# Patient Record
Sex: MALE | Race: WHITE | NOT HISPANIC OR LATINO | RURAL
[De-identification: names, ages, dates, MRNs, and addresses within clinical notes are randomized per-mention and may not be internally consistent; named-entity substitution may affect disease eponyms.]

---

## 2017-02-27 RX ORDER — MELOXICAM 15 MG/1
15 TABLET ORAL DAILY
Qty: 30 TABLET | Refills: 11 | Status: SHIPPED | OUTPATIENT
Start: 2017-02-27 | End: 2020-10-08

## 2017-03-01 ENCOUNTER — OFFICE VISIT (OUTPATIENT)
Dept: FAMILY MEDICINE CLINIC | Facility: CLINIC | Age: 47
End: 2017-03-01

## 2017-03-01 VITALS
DIASTOLIC BLOOD PRESSURE: 74 MMHG | TEMPERATURE: 97.9 F | RESPIRATION RATE: 16 BRPM | WEIGHT: 231 LBS | BODY MASS INDEX: 31.29 KG/M2 | HEART RATE: 78 BPM | HEIGHT: 72 IN | SYSTOLIC BLOOD PRESSURE: 120 MMHG

## 2017-03-01 DIAGNOSIS — E03.9 HYPOTHYROIDISM, UNSPECIFIED TYPE: ICD-10-CM

## 2017-03-01 DIAGNOSIS — E78.5 HYPERLIPIDEMIA, UNSPECIFIED HYPERLIPIDEMIA TYPE: ICD-10-CM

## 2017-03-01 DIAGNOSIS — M54.5 LOW BACK PAIN, UNSPECIFIED BACK PAIN LATERALITY, UNSPECIFIED CHRONICITY, WITH SCIATICA PRESENCE UNSPECIFIED: Primary | ICD-10-CM

## 2017-03-01 DIAGNOSIS — IMO0002 UNCONTROLLED TYPE 2 DIABETES MELLITUS WITH COMPLICATION, WITHOUT LONG-TERM CURRENT USE OF INSULIN: ICD-10-CM

## 2017-03-01 PROBLEM — M54.50 LOW BACK PAIN: Status: ACTIVE | Noted: 2017-03-01

## 2017-03-01 PROCEDURE — 96372 THER/PROPH/DIAG INJ SC/IM: CPT | Performed by: FAMILY MEDICINE

## 2017-03-01 PROCEDURE — 99213 OFFICE O/P EST LOW 20 MIN: CPT | Performed by: FAMILY MEDICINE

## 2017-03-01 RX ORDER — CYCLOBENZAPRINE HCL 5 MG
5 TABLET ORAL 2 TIMES DAILY PRN
Qty: 60 TABLET | Refills: 3 | Status: SHIPPED | OUTPATIENT
Start: 2017-03-01 | End: 2019-01-25

## 2017-03-01 NOTE — PROGRESS NOTES
Subjective   Manohar Marie is a 46 y.o. male.     Problem List  1. DM type 2   2. Hyperlipidemia  3. Hypothyroidism  4. Erectile Dysfunction  5. Left arm pain/shoulder pain    Patient is 45 yo WM with the above medical issues. States a week ago he hurt his back at home while lifting heavy object.  Mainly lower back pain around lumbar area that radiates to his right lower leg. Grades it 7/10 on severity. Mainly a dull aching pain.  Denies any alarming symptoms. Has good bowel and bladder function.  Pt states he cannot afford PT/OT and insurance will not pay for therapy.  Pt was recently given mobic 15 mg PO q daily for pain and has only taken it for a couple of days.  Has not had recent back x-rays done    Regarding DM type 2. Last hga1c was 10.0 in October 2016.  Currently taking metformin 1000 mg PO BID along with Farxiga 5 mg PO q daily.  States sugars in running usually running in 130-140.  Pt UTD with diabetic eye examination.  Is also due for new labwork.    Regarding hyperlipidemia - taking aspirin and lipitor.      Also has history of hypothyroidism and is taking synthroid 25 mcg PO q daily.   Due for recheck on that as well.      Diabetes   He presents for his follow-up diabetic visit. He has type 2 diabetes mellitus. His disease course has been fluctuating. Pertinent negatives for hypoglycemia include no confusion, dizziness, headaches, hunger, mood changes, nervousness/anxiousness, pallor, seizures, sleepiness, speech difficulty, sweats or tremors. Pertinent negatives for diabetes include no blurred vision, no chest pain, no fatigue, no foot paresthesias, no foot ulcerations, no polydipsia, no polyphagia, no polyuria, no visual change, no weakness and no weight loss. Pertinent negatives for hypoglycemia complications include no blackouts, no hospitalization, no nocturnal hypoglycemia, no required assistance and no required glucagon injection. Symptoms are stable. Pertinent negatives for diabetic  complications include no autonomic neuropathy, CVA, heart disease, impotence, nephropathy, peripheral neuropathy, PVD or retinopathy. Risk factors for coronary artery disease include diabetes mellitus, male sex and sedentary lifestyle. Current diabetic treatment includes oral agent (dual therapy). He is compliant with treatment most of the time. His weight is stable. He is following a generally unhealthy diet. When asked about meal planning, he reported none. He has not had a previous visit with a dietitian. He participates in exercise intermittently. His breakfast blood glucose is taken between 8-9 am. His breakfast blood glucose range is generally 140-180 mg/dl. An ACE inhibitor/angiotensin II receptor blocker is not being taken. He does not see a podiatrist.Eye exam is current.   Back Pain   This is a new problem. The current episode started 1 to 4 weeks ago. The problem occurs daily. The problem is unchanged. The pain is present in the lumbar spine, sacro-iliac and thoracic spine. The quality of the pain is described as aching. The pain radiates to the right foot. The pain is at a severity of 7/10. The pain is moderate. The pain is the same all the time. The symptoms are aggravated by bending, lying down, position, sitting and twisting. Stiffness is present all day. Pertinent negatives include no abdominal pain, bladder incontinence, bowel incontinence, chest pain, dysuria, fever, headaches, leg pain, numbness, paresis, paresthesias, pelvic pain, perianal numbness, tingling, weakness or weight loss. He has tried NSAIDs for the symptoms. The treatment provided mild relief.        The following portions of the patient's history were reviewed and updated as appropriate: allergies, current medications, past family history, past medical history, past social history, past surgical history and problem list.    Review of Systems   Constitutional: Negative.  Negative for fatigue, fever and weight loss.   HENT: Negative.   "  Eyes: Negative.  Negative for blurred vision.   Respiratory: Negative.    Cardiovascular: Negative.  Negative for chest pain.   Gastrointestinal: Negative.  Negative for abdominal pain and bowel incontinence.   Endocrine: Negative.  Negative for polydipsia, polyphagia and polyuria.   Genitourinary: Negative for bladder incontinence, dysuria, impotence and pelvic pain.   Musculoskeletal: Positive for arthralgias and back pain.   Skin: Negative.  Negative for pallor.   Allergic/Immunologic: Negative.    Neurological: Negative.  Negative for dizziness, tingling, tremors, seizures, speech difficulty, weakness, numbness, headaches and paresthesias.   Hematological: Negative.    Psychiatric/Behavioral: Negative.  Negative for confusion. The patient is not nervous/anxious.        Objective    Visit Vitals   • /74   • Pulse 78   • Temp 97.9 °F (36.6 °C)   • Resp 16   • Ht 72\" (182.9 cm)   • Wt 231 lb (105 kg)   • BMI 31.33 kg/m2     \    Chemistry        Component Value Date/Time     (L) 10/21/2016 1109    K 4.7 10/21/2016 1109    CL 98 10/21/2016 1109    CO2 28 10/21/2016 1109    BUN 13 10/21/2016 1109    CREATININE 0.9 10/21/2016 1109        Component Value Date/Time    CALCIUM 9.2 10/21/2016 1109    ALKPHOS 111 10/21/2016 1109    AST 19 10/21/2016 1109    ALT 34 10/21/2016 1109    BILITOT 1.1 10/21/2016 1109        Lab Results   Component Value Date    WBC 5.9 10/21/2016    HGB 16.5 10/21/2016    HCT 45.3 10/21/2016    MCV 85.2 10/21/2016     10/21/2016     No results found for: CHOL  Lab Results   Component Value Date    TRIG 187 10/21/2016     Lab Results   Component Value Date    HDL 53 (L) 10/21/2016     Lab Results   Component Value Date    LDLCALC 105 10/21/2016     No results found for: LDL  No results found for: HDLLDLRATIO  No components found for: CHOLHDL  Lab Results   Component Value Date    HGBA1C 10.5 (H) 10/21/2016     Physical Exam   Constitutional: He is oriented to person, place, " and time. He appears well-developed and well-nourished. No distress.   HENT:   Head: Normocephalic and atraumatic.   Right Ear: External ear normal.   Eyes: Conjunctivae and EOM are normal. Pupils are equal, round, and reactive to light. Right eye exhibits no discharge. Left eye exhibits no discharge. No scleral icterus.   Neck: Normal range of motion. Neck supple. No JVD present. No tracheal deviation present. No thyromegaly present.   Cardiovascular: Normal rate, regular rhythm and normal heart sounds.    Pulmonary/Chest: Breath sounds normal. No stridor. No respiratory distress. He has no wheezes.   Abdominal: Soft. Bowel sounds are normal. He exhibits no distension and no mass. There is no tenderness. There is no rebound and no guarding. No hernia.   Musculoskeletal: He exhibits tenderness. He exhibits no edema or deformity.        Thoracic back: He exhibits decreased range of motion, tenderness, bony tenderness, pain and spasm.        Lumbar back: He exhibits decreased range of motion, tenderness, bony tenderness, pain and spasm.   Straight leg  raise test negative bialterally  No obvious bulging disc.     Lymphadenopathy:     He has no cervical adenopathy.   Neurological: He is alert and oriented to person, place, and time. He has normal reflexes. No cranial nerve deficit. Coordination normal.   Skin: Skin is warm and dry. No rash noted. He is not diaphoretic. No erythema. No pallor.   Psychiatric: He has a normal mood and affect. His behavior is normal. Judgment and thought content normal.   Nursing note and vitals reviewed.      Assessment/Plan   Problems Addressed this Visit        Cardiovascular and Mediastinum    Hyperlipidemia       Endocrine    Hypothyroidism    Uncontrolled type 2 diabetes mellitus with complication, without long-term current use of insulin    Relevant Medications    Dapagliflozin Propanediol (FARXIGA) 10 MG tablet    Other Relevant Orders    CBC Auto Differential    Comprehensive  Metabolic Panel    Hemoglobin A1c    Lipid Panel    Vitamin D 25 Hydroxy    Vitamin B12    Microalbumin / Creatinine Urine Ratio    TSH       Nervous and Auditory    Low back pain - Primary    Relevant Medications    ketorolac (TORADOL) injection 60 mg (Completed) (Start on 3/1/2017 11:30 AM)    Other Relevant Orders    XR Spine Thoracic 4+ View    XR Spine Lumbar 4+ View      - For lower back pain will get thoracic and lumbar x-rays.  Pt cannot afford PT/OT at this time.  Recommended and demonstrated back exercises in office to do at home and various stretches. Will give Toradol 60 mg IM shot today.  Pt to continue with mobic 15 mg PO q daily and also flexeril 5 mg PO BID PRN.  Consider MRI of thoracic or lumbar spine if not getting better  - regarding hypothyroidsm - recheck TSH - continue with synthroid  - For DM thpe 2 - recheck hga1c and microalbumin creatinine ratio and Vitamin B!2/  Pt to continue with Farxiga but will go up on dosage from 5 to 10 mg PO q daily.  Continue metformin 1000 mg PO BID.  Consider insulin or GLP 1 agonist if still not at goal  - recheck in 1 month  - discuss immunizations on next visit  -  Hyperlipidemia - recheck lipid panel. Continue with statin and aspirin

## 2017-03-01 NOTE — PATIENT INSTRUCTIONS
Take 2 pills of farxiga 5 mg = 10 mg  Then take 10 mg of farxiga   at pharmacy    Back Pain, Adult  Back pain is very common in adults. The cause of back pain is rarely dangerous and the pain often gets better over time. The cause of your back pain may not be known. Some common causes of back pain include:  · Strain of the muscles or ligaments supporting the spine.  · Wear and tear (degeneration) of the spinal disks.  · Arthritis.  · Direct injury to the back.  For many people, back pain may return. Since back pain is rarely dangerous, most people can learn to manage this condition on their own.  HOME CARE INSTRUCTIONS  Watch your back pain for any changes. The following actions may help to lessen any discomfort you are feeling:  · Remain active. It is stressful on your back to sit or  one place for long periods of time. Do not sit, drive, or  one place for more than 30 minutes at a time. Take short walks on even surfaces as soon as you are able. Try to increase the length of time you walk each day.  · Exercise regularly as directed by your health care provider. Exercise helps your back heal faster. It also helps avoid future injury by keeping your muscles strong and flexible.  · Do not stay in bed. Resting more than 1-2 days can delay your recovery.  · Pay attention to your body when you bend and lift. The most comfortable positions are those that put less stress on your recovering back. Always use proper lifting techniques, including:    Bending your knees.    Keeping the load close to your body.    Avoiding twisting.  · Find a comfortable position to sleep. Use a firm mattress and lie on your side with your knees slightly bent. If you lie on your back, put a pillow under your knees.  · Avoid feeling anxious or stressed. Stress increases muscle tension and can worsen back pain. It is important to recognize when you are anxious or stressed and learn ways to manage it, such as with  exercise.  · Take medicines only as directed by your health care provider. Over-the-counter medicines to reduce pain and inflammation are often the most helpful. Your health care provider may prescribe muscle relaxant drugs. These medicines help dull your pain so you can more quickly return to your normal activities and healthy exercise.  · Apply ice to the injured area:    Put ice in a plastic bag.    Place a towel between your skin and the bag.    Leave the ice on for 20 minutes, 2-3 times a day for the first 2-3 days. After that, ice and heat may be alternated to reduce pain and spasms.  · Maintain a healthy weight. Excess weight puts extra stress on your back and makes it difficult to maintain good posture.  SEEK MEDICAL CARE IF:  · You have pain that is not relieved with rest or medicine.  · You have increasing pain going down into the legs or buttocks.  · You have pain that does not improve in one week.  · You have night pain.  · You lose weight.  · You have a fever or chills.  SEEK IMMEDIATE MEDICAL CARE IF:   · You develop new bowel or bladder control problems.  · You have unusual weakness or numbness in your arms or legs.  · You develop nausea or vomiting.  · You develop abdominal pain.  · You feel faint.     This information is not intended to replace advice given to you by your health care provider. Make sure you discuss any questions you have with your health care provider.     Document Released: 12/18/2006 Document Revised: 01/08/2016 Document Reviewed: 04/21/2015  [employer] Interactive Patient Education ©2016 [employer]    Back Exercises  The following exercises strengthen the muscles that help to support the back. They also help to keep the lower back flexible. Doing these exercises can help to prevent back pain or lessen existing pain.  If you have back pain or discomfort, try doing these exercises 2-3 times each day or as told by your health care provider. When the pain goes away, do them once  each day, but increase the number of times that you repeat the steps for each exercise (do more repetitions). If you do not have back pain or discomfort, do these exercises once each day or as told by your health care provider.  EXERCISES  Single Knee to Chest  Repeat these steps 3-5 times for each le. Lie on your back on a firm bed or the floor with your legs extended.  2. Bring one knee to your chest. Your other leg should stay extended and in contact with the floor.  3. Hold your knee in place by grabbing your knee or thigh.  4. Pull on your knee until you feel a gentle stretch in your lower back.  5. Hold the stretch for 10-30 seconds.  6. Slowly release and straighten your leg.  Pelvic Tilt  Repeat these steps 5-10 times:  1. Lie on your back on a firm bed or the floor with your legs extended.  2. Bend your knees so they are pointing toward the ceiling and your feet are flat on the floor.  3. Tighten your lower abdominal muscles to press your lower back against the floor. This motion will tilt your pelvis so your tailbone points up toward the ceiling instead of pointing to your feet or the floor.  4. With gentle tension and even breathing, hold this position for 5-10 seconds.  Cat-Cow  Repeat these steps until your lower back becomes more flexible:  1. Get into a hands-and-knees position on a firm surface. Keep your hands under your shoulders, and keep your knees under your hips. You may place padding under your knees for comfort.  2. Let your head hang down, and point your tailbone toward the floor so your lower back becomes rounded like the back of a cat.  3. Hold this position for 5 seconds.  4. Slowly lift your head and point your tailbone up toward the ceiling so your back forms a sagging arch like the back of a cow.  5. Hold this position for 5 seconds.  Press-Ups  Repeat these steps 5-10 times:  1. Lie on your abdomen (face-down) on the floor.  2. Place your palms near your head, about  shoulder-width apart.  3. While you keep your back as relaxed as possible and keep your hips on the floor, slowly straighten your arms to raise the top half of your body and lift your shoulders. Do not use your back muscles to raise your upper torso. You may adjust the placement of your hands to make yourself more comfortable.  4. Hold this position for 5 seconds while you keep your back relaxed.  5. Slowly return to lying flat on the floor.  Bridges  Repeat these steps 10 times:  1. Lie on your back on a firm surface.  2. Bend your knees so they are pointing toward the ceiling and your feet are flat on the floor.  3. Tighten your buttocks muscles and lift your buttocks off of the floor until your waist is at almost the same height as your knees. You should feel the muscles working in your buttocks and the back of your thighs. If you do not feel these muscles, slide your feet 1-2 inches farther away from your buttocks.  4. Hold this position for 3-5 seconds.  5. Slowly lower your hips to the starting position, and allow your buttocks muscles to relax completely.  If this exercise is too easy, try doing it with your arms crossed over your chest.  Abdominal Crunches  Repeat these steps 5-10 times:  1. Lie on your back on a firm bed or the floor with your legs extended.  2. Bend your knees so they are pointing toward the ceiling and your feet are flat on the floor.  3. Cross your arms over your chest.  4. Tip your chin slightly toward your chest without bending your neck.  5. Tighten your abdominal muscles and slowly raise your trunk (torso) high enough to lift your shoulder blades a tiny bit off of the floor. Avoid raising your torso higher than that, because it can put too much stress on your low back and it does not help to strengthen your abdominal muscles.  6. Slowly return to your starting position.  Back Lifts  Repeat these steps 5-10 times:  1. Lie on your abdomen (face-down) with your arms at your sides, and  rest your forehead on the floor.  2. Tighten the muscles in your legs and your buttocks.  3. Slowly lift your chest off of the floor while you keep your hips pressed to the floor. Keep the back of your head in line with the curve in your back. Your eyes should be looking at the floor.  4. Hold this position for 3-5 seconds.  5. Slowly return to your starting position.  SEEK MEDICAL CARE IF:  · Your back pain or discomfort gets much worse when you do an exercise.  · Your back pain or discomfort does not lessen within 2 hours after you exercise.  If you have any of these problems, stop doing these exercises right away. Do not do them again unless your health care provider says that you can.  SEEK IMMEDIATE MEDICAL CARE IF:  · You develop sudden, severe back pain. If this happens, stop doing the exercises right away. Do not do them again unless your health care provider says that you can.     This information is not intended to replace advice given to you by your health care provider. Make sure you discuss any questions you have with your health care provider.     Document Released: 01/25/2006 Document Revised: 09/07/2016 Document Reviewed: 02/11/2016  [employer] Interactive Patient Education ©2016 [employer]

## 2019-01-25 ENCOUNTER — OFFICE VISIT (OUTPATIENT)
Dept: FAMILY MEDICINE CLINIC | Facility: CLINIC | Age: 49
End: 2019-01-25

## 2019-01-25 VITALS
TEMPERATURE: 97.3 F | OXYGEN SATURATION: 96 % | HEIGHT: 72 IN | SYSTOLIC BLOOD PRESSURE: 132 MMHG | DIASTOLIC BLOOD PRESSURE: 82 MMHG | BODY MASS INDEX: 30.48 KG/M2 | HEART RATE: 99 BPM | WEIGHT: 225 LBS

## 2019-01-25 DIAGNOSIS — IMO0001 UNCONTROLLED TYPE 2 DIABETES MELLITUS WITHOUT COMPLICATION, WITHOUT LONG-TERM CURRENT USE OF INSULIN: ICD-10-CM

## 2019-01-25 DIAGNOSIS — J20.9 ACUTE BRONCHITIS, UNSPECIFIED ORGANISM: Primary | ICD-10-CM

## 2019-01-25 DIAGNOSIS — R21 RASH: ICD-10-CM

## 2019-01-25 LAB — GLUCOSE BLDC GLUCOMTR-MCNC: 278 MG/DL (ref 70–130)

## 2019-01-25 PROCEDURE — 99214 OFFICE O/P EST MOD 30 MIN: CPT | Performed by: NURSE PRACTITIONER

## 2019-01-25 PROCEDURE — 82962 GLUCOSE BLOOD TEST: CPT | Performed by: NURSE PRACTITIONER

## 2019-01-25 RX ORDER — TRIAMCINOLONE ACETONIDE 1 MG/G
CREAM TOPICAL 2 TIMES DAILY PRN
Qty: 80 G | Refills: 0 | Status: SHIPPED | OUTPATIENT
Start: 2019-01-25 | End: 2020-10-26

## 2019-01-25 RX ORDER — AZITHROMYCIN 250 MG/1
TABLET, FILM COATED ORAL
Qty: 6 TABLET | Refills: 0 | Status: SHIPPED | OUTPATIENT
Start: 2019-01-25 | End: 2020-10-26

## 2019-01-25 NOTE — PATIENT INSTRUCTIONS
Rash  A rash is a change in the color of the skin. A rash can also change the way your skin feels. There are many different conditions and factors that can cause a rash.  Follow these instructions at home:  Pay attention to any changes in your symptoms. Follow these instructions to help with your condition:  Medicine  Take or apply over-the-counter and prescription medicines only as told by your doctor. These may include:  · Corticosteroid cream.  · Anti-itch lotions.  · Oral antihistamines.    Skin Care  · Put cool compresses on the affected areas.  · Try taking a bath with:  ? Epsom salts. Follow the instructions on the packaging. You can get these at your local pharmacy or grocery store.  ? Baking soda. Pour a small amount into the bath as told by your doctor.  ? Colloidal oatmeal. Follow the instructions on the packaging. You can get this at your local pharmacy or grocery store.  · Try putting baking soda paste onto your skin. Stir water into baking soda until it gets like a paste.  · Do not scratch or rub your skin.  · Avoid covering the rash. Make sure the rash is exposed to air as much as possible.  General instructions  · Avoid hot showers or baths, which can make itching worse. A cold shower may help.  · Avoid scented soaps, detergents, and perfumes. Use gentle soaps, detergents, perfumes, and other cosmetic products.  · Avoid anything that causes your rash. Keep a journal to help track what causes your rash. Write down:  ? What you eat.  ? What cosmetic products you use.  ? What you drink.  ? What you wear. This includes jewelry.  · Keep all follow-up visits as told by your doctor. This is important.  Contact a doctor if:  · You sweat at night.  · You lose weight.  · You pee (urinate) more than normal.  · You feel weak.  · You throw up (vomit).  · Your skin or the whites of your eyes look yellow (jaundice).  · Your skin:  ? Tingles.  ? Is numb.  · Your rash:  ? Does not go away after a few days.  ? Gets  worse.  · You are:  ? More thirsty than normal.  ? More tired than normal.  · You have:  ? New symptoms.  ? Pain in your belly (abdomen).  ? A fever.  ? Watery poop (diarrhea).  Get help right away if:  · Your rash covers all or most of your body. The rash may or may not be painful.  · You have blisters that:  ? Are on top of the rash.  ? Grow larger.  ? Grow together.  ? Are painful.  ? Are inside your nose or mouth.  · You have a rash that:  ? Looks like purple pinprick-sized spots all over your body.  ? Has a “bull's eye” or looks like a target.  ? Is red and painful, causes your skin to peel, and is not from being in the sun too long.  This information is not intended to replace advice given to you by your health care provider. Make sure you discuss any questions you have with your health care provider.  Document Released: 06/05/2009 Document Revised: 05/25/2017 Document Reviewed: 05/04/2016  [employer] Interactive Patient Education © 2018 [employer]  Acute Bronchitis, Adult  Acute bronchitis is when air tubes (bronchi) in the lungs suddenly get swollen. The condition can make it hard to breathe. It can also cause these symptoms:  · A cough.  · Coughing up clear, yellow, or green mucus.  · Wheezing.  · Chest congestion.  · Shortness of breath.  · A fever.  · Body aches.  · Chills.  · A sore throat.    Follow these instructions at home:  Medicines  · Take over-the-counter and prescription medicines only as told by your doctor.  · If you were prescribed an antibiotic medicine, take it as told by your doctor. Do not stop taking the antibiotic even if you start to feel better.  General instructions  · Rest.  · Drink enough fluids to keep your pee (urine) clear or pale yellow.  · Avoid smoking and secondhand smoke. If you smoke and you need help quitting, ask your doctor. Quitting will help your lungs heal faster.  · Use an inhaler, cool mist vaporizer, or humidifier as told by your doctor.  · Keep all follow-up  visits as told by your doctor. This is important.  How is this prevented?  To lower your risk of getting this condition again:  · Wash your hands often with soap and water. If you cannot use soap and water, use hand .  · Avoid contact with people who have cold symptoms.  · Try not to touch your hands to your mouth, nose, or eyes.  · Make sure to get the flu shot every year.    Contact a doctor if:  · Your symptoms do not get better in 2 weeks.  Get help right away if:  · You cough up blood.  · You have chest pain.  · You have very bad shortness of breath.  · You become dehydrated.  · You faint (pass out) or keep feeling like you are going to pass out.  · You keep throwing up (vomiting).  · You have a very bad headache.  · Your fever or chills gets worse.  This information is not intended to replace advice given to you by your health care provider. Make sure you discuss any questions you have with your health care provider.  Document Released: 06/05/2009 Document Revised: 07/26/2017 Document Reviewed: 06/07/2017  [employer] Interactive Patient Education © 2018 [employer]

## 2019-01-25 NOTE — PROGRESS NOTES
"Subjective   Manohar Marie is a 48 y.o. male.     [facility] Visit    PCP: Dr. Solis--has not seen since 2017    CC: \"chest cold x 3 weeks; itchy head for x 6 weeks\"          Cough   This is a new problem. The current episode started 1 to 4 weeks ago. The problem has been unchanged. The problem occurs every few minutes. The cough is non-productive. Associated symptoms include a rash. Pertinent negatives include no chest pain, chills, ear congestion, ear pain, fever, headaches, heartburn, hemoptysis, myalgias, nasal congestion, postnasal drip, rhinorrhea, sore throat, shortness of breath, sweats, weight loss or wheezing. Associated symptoms comments: Started with sinus congestion/drainage, but that improved after the first week  . The symptoms are aggravated by lying down. Treatments tried: mucinex yesterday. The treatment provided mild relief. There is no history of asthma or COPD.   Rash   This is a new problem. The current episode started more than 1 month ago. The problem has been waxing and waning since onset. The affected locations include the scalp. The rash is characterized by itchiness. Associated with: wears hats frequently. Associated symptoms include coughing. Pertinent negatives include no anorexia, congestion, diarrhea, eye pain, facial edema, fatigue, fever, joint pain, nail changes, rhinorrhea, shortness of breath, sore throat or vomiting. Treatments tried: tried Benadryl for a couple of days which seemed to help; also using a t-gel shampoo which helps some as well. The treatment provided mild relief. There is no history of asthma.        The following portions of the patient's history were reviewed and updated as appropriate: allergies, current medications, past medical history, past social history, past surgical history and problem list.    Review of Systems   Constitutional: Negative for appetite change, chills, fatigue, fever and unexpected weight loss.   HENT: Negative for congestion, " "ear pain, postnasal drip, rhinorrhea, sinus pressure, sneezing and sore throat.    Eyes: Negative for pain, discharge and itching.   Respiratory: Positive for cough and chest tightness. Negative for hemoptysis, shortness of breath and wheezing.    Cardiovascular: Negative for chest pain.   Gastrointestinal: Negative for anorexia, diarrhea, nausea and vomiting.   Musculoskeletal: Negative for joint pain, myalgias, neck pain and neck stiffness.   Skin: Positive for rash. Negative for nail changes.   Neurological: Negative for dizziness and headache.   Hematological: Negative for adenopathy.       Objective    /82 (BP Location: Left arm, Patient Position: Sitting, Cuff Size: Adult)   Pulse 99   Temp 97.3 °F (36.3 °C) (Tympanic)   Ht 182.9 cm (72\")   Wt 102 kg (225 lb)   SpO2 96%   BMI 30.52 kg/m²     Physical Exam   Constitutional: He is oriented to person, place, and time. He appears well-developed and well-nourished. No distress.   HENT:   Head: Normocephalic and atraumatic.   Right Ear: Tympanic membrane and ear canal normal.   Left Ear: Tympanic membrane and ear canal normal.   Nose: Nose normal. Right sinus exhibits no maxillary sinus tenderness and no frontal sinus tenderness. Left sinus exhibits no maxillary sinus tenderness and no frontal sinus tenderness.   Mouth/Throat: Uvula is midline, oropharynx is clear and moist and mucous membranes are normal.   Eyes: Conjunctivae are normal. Right eye exhibits no discharge. Left eye exhibits no discharge.   Neck: Neck supple.   Cardiovascular: Normal rate and regular rhythm.   Pulmonary/Chest: Effort normal. He has decreased breath sounds ( slightly). He has no wheezes. He has no rales.   Tight, congested cough noted   Lymphadenopathy:     He has no cervical adenopathy.   Neurological: He is alert and oriented to person, place, and time.   Skin:        Nursing note and vitals reviewed.    Recent Results (from the past 24 hour(s))   POC Glucose    Collection " Time: 01/25/19 12:32 PM   Result Value Ref Range    Glucose 278 (A) 70 - 130 mg/dL         Assessment/Plan   Manohar was seen today for cough, uri and hair/scalp problem.    Diagnoses and all orders for this visit:    Acute bronchitis, unspecified organism  -     azithromycin (ZITHROMAX Z-ROSMERY) 250 MG tablet; Take 2 tablets the first day, then 1 tablet daily for 4 days.    Uncontrolled type 2 diabetes mellitus without complication, without long-term current use of insulin (CMS/[facility])  -     POC Glucose    Rash  -     triamcinolone (KENALOG) 0.1 % cream; Apply  topically to the appropriate area as directed 2 (Two) Times a Day As Needed for Irritation or Rash. To bumps on scalp      Push fluids  Rest  Continue with Mucinex for the next 4-5 days  Rx for Zithromax provided  Declines inhaler  Will defer steroid due to elevated glucose levels  Rx for Steroid cream to use on rash to scalp as needed as I suspect some degree of dermatitis from wearing hats    Stressed the importance of regular f/u visits regarding diabetes taking medications as prescribed (reports he only takes meds occasionally).   He will schedule f/u for diabetes management with Dr. Solis prior to leaving office today.

## 2020-10-08 ENCOUNTER — TELEPHONE (OUTPATIENT)
Dept: FAMILY MEDICINE CLINIC | Facility: CLINIC | Age: 50
End: 2020-10-08

## 2020-10-08 ENCOUNTER — OFFICE VISIT (OUTPATIENT)
Dept: FAMILY MEDICINE CLINIC | Facility: CLINIC | Age: 50
End: 2020-10-08

## 2020-10-08 VITALS
TEMPERATURE: 97.5 F | WEIGHT: 222.2 LBS | OXYGEN SATURATION: 100 % | DIASTOLIC BLOOD PRESSURE: 96 MMHG | BODY MASS INDEX: 30.14 KG/M2 | SYSTOLIC BLOOD PRESSURE: 138 MMHG | HEART RATE: 74 BPM

## 2020-10-08 DIAGNOSIS — M54.50 ACUTE LOW BACK PAIN WITHOUT SCIATICA, UNSPECIFIED BACK PAIN LATERALITY: Primary | ICD-10-CM

## 2020-10-08 PROCEDURE — 99213 OFFICE O/P EST LOW 20 MIN: CPT | Performed by: STUDENT IN AN ORGANIZED HEALTH CARE EDUCATION/TRAINING PROGRAM

## 2020-10-08 RX ORDER — MELOXICAM 15 MG/1
15 TABLET ORAL DAILY
Qty: 60 TABLET | Refills: 0 | Status: SHIPPED | OUTPATIENT
Start: 2020-10-08

## 2020-10-08 NOTE — PROGRESS NOTES
Subjective:  Manohar Marie is a 50 y.o. male who presents for back pain    Started couple days ago at work; has gradually gotten worse; denies inciting event or trauma; pain does not radiate; no incontinence, numbness, tingling, weakness; has occurred in the past; was seen for similar issue back in 2017; had insurance issues so has not returned to care; problem went away with meloxicam; did not see physical therapy at that time; x-rays were ordered however they were not obtained; reports history of herniated disc.      Patient Active Problem List   Diagnosis   • Erectile dysfunction   • Hypothyroidism   • Hyperlipidemia   • Uncontrolled type 2 diabetes mellitus without complication, without long-term current use of insulin   • Left arm pain   • Chronic left shoulder pain   • Low back pain   • Uncontrolled type 2 diabetes mellitus with complication, without long-term current use of insulin (CMS/[facility])   • Acute bronchitis   • Rash           Current Outpatient Medications:   •  aspirin 81 MG tablet, Take 1 tablet by mouth Daily., Disp: 30 tablet, Rfl: 11  •  atorvastatin (LIPITOR) 20 MG tablet, Take 1 tablet by mouth Daily., Disp: 30 tablet, Rfl: 11  •  azithromycin (ZITHROMAX Z-ROSMERY) 250 MG tablet, Take 2 tablets the first day, then 1 tablet daily for 4 days., Disp: 6 tablet, Rfl: 0  •  Dapagliflozin Propanediol (FARXIGA) 10 MG tablet, Take 10 mg by mouth Daily., Disp: 30 tablet, Rfl: 11  •  levothyroxine (LEVOTHROID) 25 MCG tablet, Take 1 tablet by mouth Daily., Disp: 30 tablet, Rfl: 11  •  meloxicam (Mobic) 15 MG tablet, Take 1 tablet by mouth Daily., Disp: 60 tablet, Rfl: 0  •  metFORMIN (GLUCOPHAGE) 1000 MG tablet, Take 1 tablet by mouth 2 (Two) Times a Day With Meals., Disp: 60 tablet, Rfl: 11  •  sildenafil (VIAGRA) 25 MG tablet, Take 1 tablet by mouth Daily As Needed for erectile dysfunction., Disp: 10 tablet, Rfl: 6  •  triamcinolone (KENALOG) 0.1 % cream, Apply  topically to the appropriate area as  directed 2 (Two) Times a Day As Needed for Irritation or Rash. To bumps on scalp, Disp: 80 g, Rfl: 0      Review of Systems  Review of Systems   Constitutional: Negative for chills and fever.   HENT: Negative for congestion and sore throat.    Eyes: Negative for pain and visual disturbance.   Respiratory: Negative for cough and shortness of breath.    Cardiovascular: Negative for chest pain and palpitations.   Gastrointestinal: Negative for nausea and vomiting.   Endocrine: Negative for polydipsia and polyuria.   Genitourinary: Negative for dysuria and hematuria.   Musculoskeletal: Positive for back pain. Negative for neck stiffness.   Skin: Negative for rash and wound.   Neurological: Negative for dizziness and headaches.   Psychiatric/Behavioral: Negative for behavioral problems and confusion.       Patient Active Problem List   Diagnosis   • Erectile dysfunction   • Hypothyroidism   • Hyperlipidemia   • Uncontrolled type 2 diabetes mellitus without complication, without long-term current use of insulin   • Left arm pain   • Chronic left shoulder pain   • Low back pain   • Uncontrolled type 2 diabetes mellitus with complication, without long-term current use of insulin (CMS/[facility])   • Acute bronchitis   • Rash     Past Surgical History:   Procedure Laterality Date   • KIDNEY STONE SURGERY       Social History     Socioeconomic History   • Marital status: Single     Spouse name: Not on file   • Number of children: Not on file   • Years of education: Not on file   • Highest education level: Not on file   Tobacco Use   • Smoking status: Never Smoker   • Smokeless tobacco: Never Used   Substance and Sexual Activity   • Alcohol use: No   • Drug use: No   • Sexual activity: Defer     Family History   Problem Relation Age of Onset   • Cancer Mother    • Diabetes Father      No visits with results within 6 Month(s) from this visit.   Latest known visit with results is:   Office Visit on 01/25/2019   Component Date Value Ref  Range Status   • Glucose 01/25/2019 278* 70 - 130 mg/dL Final      No image results found.    [unfilled]    There is no immunization history on file for this patient.    The following portions of the patient's history were reviewed and updated as appropriate: allergies, current medications, past family history, past medical history, past social history, past surgical history and problem list.        Physical Exam  Physical Exam  Constitutional:       General: He is not in acute distress.  HENT:      Head: Normocephalic and atraumatic.      Right Ear: Tympanic membrane and ear canal normal.      Left Ear: Tympanic membrane and ear canal normal.      Mouth/Throat:      Mouth: Mucous membranes are moist.      Pharynx: Oropharynx is clear. No posterior oropharyngeal erythema.   Eyes:      Extraocular Movements: Extraocular movements intact.      Pupils: Pupils are equal, round, and reactive to light.   Cardiovascular:      Rate and Rhythm: Normal rate and regular rhythm.      Heart sounds: Normal heart sounds. No murmur.   Pulmonary:      Effort: Pulmonary effort is normal.      Breath sounds: Normal breath sounds.   Abdominal:      General: Bowel sounds are normal.      Palpations: Abdomen is soft.      Tenderness: There is no abdominal tenderness.   Musculoskeletal:        Arms:       Right lower leg: No edema.      Left lower leg: No edema.   Skin:     Coloration: Skin is not jaundiced.      Findings: No rash.   Neurological:      Mental Status: He is alert and oriented to person, place, and time. Mental status is at baseline.   Psychiatric:         Mood and Affect: Mood normal.         Behavior: Behavior normal.         Assessment/Plan    Diagnosis Plan   1. Acute low back pain without sciatica, unspecified back pain laterality  XR Spine Lumbar 4+ View (In Office)    meloxicam (Mobic) 15 MG tablet    Ambulatory Referral to Physical Therapy      Orders Placed This Encounter   Procedures   • XR Spine Lumbar 4+ View  (In Office)     Order Specific Question:   Reason for Exam:     Answer:   low back pain   • Ambulatory Referral to Physical Therapy     Referral Priority:   Routine     Referral Type:   Therapy     Referral Reason:   Specialty Services Required     Requested Specialty:   Physical Therapy     Number of Visits Requested:   1     Back pain, uncomplicated, does not radiate; prescribed meloxicam daily for 2 weeks, as needed thereafter; counseled on possible adverse effects of medication; will obtain x-ray of spine and refer to physical therapy; counseled patient on need to follow-up with Dr. Solis for chronic medical issues and management, patient agreeable, follow-up in 2 weeks         This document has been electronically signed by Salvador Singh MD on October 8, 2020 14:10 CDT

## 2020-10-14 NOTE — PROGRESS NOTES
Subjective:  Manohar Marie is a 50 y.o. male who presents for       Patient Active Problem List   Diagnosis   • Erectile dysfunction   • Hypothyroidism   • Hyperlipidemia   • Uncontrolled type 2 diabetes mellitus without complication, without long-term current use of insulin   • Left arm pain   • Chronic left shoulder pain   • Low back pain   • Uncontrolled type 2 diabetes mellitus with complication, without long-term current use of insulin (CMS/H[facility])   • Acute bronchitis   • Rash   • Chronic low back pain with bilateral sciatica   • Overweight           Current Outpatient Medications:   •  meloxicam (Mobic) 15 MG tablet, Take 1 tablet by mouth Daily., Disp: 60 tablet, Rfl: 0      Pt is 49 yo male with management of obesity, DM type 2, hypothyroidism, ED, HLP, sp kidney stone surgery, chronic back pain       3/1/17 Patient is 47 yo WM with the above medical issues. States a week ago he hurt his back at home while lifting heavy object.  Mainly lower back pain around lumbar area that radiates to his right lower leg. Grades it 7/10 on severity. Mainly a dull aching pain.  Denies any alarming symptoms. Has good bowel and bladder function.  Pt states he cannot afford PT/OT and insurance will not pay for therapy.  Pt was recently given mobic 15 mg PO q daily for pain and has only taken it for a couple of days.  Has not had recent back x-rays doneRegarding DM type 2. Last hga1c was 10.0 in October 2016.  Currently taking metformin 1000 mg PO BID along with Farxiga 5 mg PO q daily.  States sugars in running usually running in 130-140.  Pt UTD with diabetic eye examination.  Is also due for new labwork.Regarding hyperlipidemia - taking aspirin and lipitor.     Also has history of hypothyroidism and is taking synthroid 25 mcg PO q daily.   Due for recheck on that as well.     10/26/20 pt is here to reestablish. I have not seen pt since 2017. Pt has history of DM Type 2, hypothyroidism. Recently saw walk in clinic  With  Dr. Singh on 10/8/20 for back pain. He was referred to PT/OT and restarted on mobic. He was also ordered X-ray of lumbar spine that was orderd on 10/8/20 and has yet to be completed.  He stoped taking his medications for HLP, DM type 2   He stopped taking his medication over a year ago. He states he feels fatigued.   He works 5 days a week parttime.  He lifts boxes every day.  His back is feeling somewhat better .          Diabetes  He presents for his follow-up diabetic visit. His disease course has been fluctuating. Pertinent negatives for hypoglycemia include no confusion, dizziness, headaches, hunger, mood changes, nervousness/anxiousness, pallor, seizures or tremors. Associated symptoms include weakness. Pertinent negatives for diabetes include no chest pain, no fatigue, no visual change and no weight loss. Pertinent negatives for hypoglycemia complications include no blackouts and no hospitalization. Symptoms are stable. Pertinent negatives for diabetic complications include no autonomic neuropathy or heart disease. Risk factors for coronary artery disease include dyslipidemia, diabetes mellitus, obesity, sedentary lifestyle and stress. He is compliant with treatment most of the time. He is following a generally unhealthy diet. When asked about meal planning, he reported none. An ACE inhibitor/angiotensin II receptor blocker is not being taken. He does not see a podiatrist.Eye exam is not current.   Thyroid Problem  Presents for follow-up visit. Patient reports no anxiety, cold intolerance, constipation, depressed mood, diaphoresis, diarrhea, dry skin, fatigue, hair loss, heat intolerance, hoarse voice, leg swelling, menstrual problem, nail problem, palpitations, tremors, visual change, weight gain or weight loss.   Back Pain  This is a chronic problem. The current episode started more than 1 year ago. The problem occurs constantly. The problem is unchanged. The pain is present in the lumbar spine. The quality  of the pain is described as aching. The pain is at a severity of 3/10. The pain is mild. The pain is the same all the time. The symptoms are aggravated by bending, lying down, position and twisting. Associated symptoms include numbness and weakness. Pertinent negatives include no abdominal pain, bladder incontinence, bowel incontinence, chest pain, dysuria, fever, headaches, leg pain, paresis, paresthesias, pelvic pain, perianal numbness, tingling or weight loss. He has tried NSAIDs for the symptoms. The treatment provided mild relief.                  Review of Systems  Review of Systems   Constitutional: Positive for activity change. Negative for appetite change, chills, diaphoresis, fatigue, fever, weight gain and weight loss.   HENT: Negative for congestion, hoarse voice, postnasal drip, rhinorrhea, sinus pressure, sinus pain, sneezing, sore throat, trouble swallowing and voice change.    Respiratory: Negative for cough, choking, chest tightness, shortness of breath, wheezing and stridor.    Cardiovascular: Negative for chest pain and palpitations.   Gastrointestinal: Negative for abdominal pain, bowel incontinence, constipation, diarrhea, nausea and vomiting.   Endocrine: Negative for cold intolerance and heat intolerance.   Genitourinary: Negative for bladder incontinence, dysuria, menstrual problem and pelvic pain.   Musculoskeletal: Positive for arthralgias and back pain.   Skin: Negative for pallor.   Neurological: Positive for weakness and numbness. Negative for dizziness, tingling, tremors, seizures, headaches and paresthesias.   Psychiatric/Behavioral: Negative for confusion. The patient is not nervous/anxious.        Patient Active Problem List   Diagnosis   • Erectile dysfunction   • Hypothyroidism   • Hyperlipidemia   • Uncontrolled type 2 diabetes mellitus without complication, without long-term current use of insulin   • Left arm pain   • Chronic left shoulder pain   • Low back pain   • Uncontrolled  "type 2 diabetes mellitus with complication, without long-term current use of insulin (CMS/[facility])   • Acute bronchitis   • Rash   • Chronic low back pain with bilateral sciatica   • Overweight     Past Surgical History:   Procedure Laterality Date   • KIDNEY STONE SURGERY       Social History     Socioeconomic History   • Marital status: Single     Spouse name: Not on file   • Number of children: Not on file   • Years of education: Not on file   • Highest education level: Not on file   Tobacco Use   • Smoking status: Never Smoker   • Smokeless tobacco: Never Used   Substance and Sexual Activity   • Alcohol use: No   • Drug use: No   • Sexual activity: Defer     Family History   Problem Relation Age of Onset   • Cancer Mother    • Diabetes Father      No visits with results within 6 Month(s) from this visit.   Latest known visit with results is:   Office Visit on 01/25/2019   Component Date Value Ref Range Status   • Glucose 01/25/2019 278* 70 - 130 mg/dL Final      No image results found.    @[facility]@    There is no immunization history on file for this patient.    The following portions of the patient's history were reviewed and updated as appropriate: allergies, current medications, past family history, past medical history, past social history, past surgical history and problem list.        Physical Exam  Temp 97.3 °F (36.3 °C)   Ht 182.9 cm (72\")   Wt 99.8 kg (220 lb)   BMI 29.84 kg/m²     Physical Exam  Vitals signs and nursing note reviewed.   Constitutional:       Appearance: He is well-developed. He is not diaphoretic.   HENT:      Head: Normocephalic and atraumatic.      Right Ear: External ear normal.   Eyes:      Conjunctiva/sclera: Conjunctivae normal.      Pupils: Pupils are equal, round, and reactive to light.   Neck:      Musculoskeletal: Normal range of motion and neck supple.   Cardiovascular:      Rate and Rhythm: Normal rate and regular rhythm.      Heart sounds: Normal heart sounds. No " murmur.   Pulmonary:      Effort: Pulmonary effort is normal. No respiratory distress.      Breath sounds: Normal breath sounds.   Abdominal:      General: Bowel sounds are normal. There is no distension.      Palpations: Abdomen is soft.      Tenderness: There is no abdominal tenderness.   Musculoskeletal:         General: Tenderness present. No deformity.      Lumbar back: He exhibits decreased range of motion, tenderness, bony tenderness, pain and spasm.   Skin:     General: Skin is warm.      Coloration: Skin is not pale.      Findings: No erythema or rash.   Neurological:      Mental Status: He is alert and oriented to person, place, and time.      Cranial Nerves: No cranial nerve deficit.   Psychiatric:         Behavior: Behavior normal.         Assessment/Plan    Diagnosis Plan   1. Uncontrolled type 2 diabetes mellitus with complication, without long-term current use of insulin (CMS/[facility])  CBC Auto Differential    Comprehensive Metabolic Panel    Hemoglobin A1c    Lipid Panel    TSH    T4, Free    T3, Free    Vitamin D 25 Hydroxy    Vitamin B12    Microalbumin / Creatinine Urine Ratio - Urine, Clean Catch    Hepatitis C Antibody   2. Hypothyroidism, unspecified type  CBC Auto Differential    Comprehensive Metabolic Panel    Hemoglobin A1c    Lipid Panel    TSH    T4, Free    T3, Free    Vitamin D 25 Hydroxy    Vitamin B12    Microalbumin / Creatinine Urine Ratio - Urine, Clean Catch    Hepatitis C Antibody   3. Hyperlipidemia, unspecified hyperlipidemia type  CBC Auto Differential    Comprehensive Metabolic Panel    Hemoglobin A1c    Lipid Panel    TSH    T4, Free    T3, Free    Vitamin D 25 Hydroxy    Vitamin B12    Microalbumin / Creatinine Urine Ratio - Urine, Clean Catch    Hepatitis C Antibody   4. Chronic low back pain with bilateral sciatica, unspecified back pain laterality  CBC Auto Differential    Comprehensive Metabolic Panel    Hemoglobin A1c    Lipid Panel    TSH    T4, Free    T3, Free     Vitamin D 25 Hydroxy    Vitamin B12    Microalbumin / Creatinine Urine Ratio - Urine, Clean Catch    Hepatitis C Antibody   5. Need for hepatitis C screening test  CBC Auto Differential    Comprehensive Metabolic Panel    Hemoglobin A1c    Lipid Panel    TSH    T4, Free    T3, Free    Vitamin D 25 Hydroxy    Vitamin B12    Microalbumin / Creatinine Urine Ratio - Urine, Clean Catch    Hepatitis C Antibody   6. Overweight          -recommend labwork  -recommend colonoscopy  -annual physical exam today   -recommend pneumonia vaccination   -recommend influenza vaccination  -hep C screening - hep c antibody  -overweight - counseled weight loss >5 minutes BMI at 29.84.   -hypothyroidism - of synthroid   -HLP -of lipitor   -DM type 2 - of farxiga   -back pain - on mobic/ referred to PT/OT.  X-ray of lumbar spine ordere.    -advised pt to be safe and call with questions and concerns  -advsied pt to go to ER or call 911 if symptoms worrisome or severe  -advised pt to followup with specialist and referrals  -advised pt to be safe during COVID-19 pandemic  -total time with pt >25 minutes   -recheck in 2 weeks         This document has been electronically signed by Star Solis MD on October 26, 2020 13:12 CDT

## 2020-10-26 ENCOUNTER — OFFICE VISIT (OUTPATIENT)
Dept: FAMILY MEDICINE CLINIC | Facility: CLINIC | Age: 50
End: 2020-10-26

## 2020-10-26 VITALS
TEMPERATURE: 97.3 F | SYSTOLIC BLOOD PRESSURE: 112 MMHG | DIASTOLIC BLOOD PRESSURE: 68 MMHG | BODY MASS INDEX: 29.8 KG/M2 | WEIGHT: 220 LBS | HEART RATE: 88 BPM | HEIGHT: 72 IN | OXYGEN SATURATION: 98 %

## 2020-10-26 DIAGNOSIS — G89.29 CHRONIC LOW BACK PAIN WITH BILATERAL SCIATICA, UNSPECIFIED BACK PAIN LATERALITY: Primary | ICD-10-CM

## 2020-10-26 DIAGNOSIS — M54.41 CHRONIC LOW BACK PAIN WITH BILATERAL SCIATICA, UNSPECIFIED BACK PAIN LATERALITY: Primary | ICD-10-CM

## 2020-10-26 DIAGNOSIS — E78.5 HYPERLIPIDEMIA, UNSPECIFIED HYPERLIPIDEMIA TYPE: ICD-10-CM

## 2020-10-26 DIAGNOSIS — IMO0002 UNCONTROLLED TYPE 2 DIABETES MELLITUS WITH COMPLICATION, WITHOUT LONG-TERM CURRENT USE OF INSULIN: ICD-10-CM

## 2020-10-26 DIAGNOSIS — M54.42 CHRONIC LOW BACK PAIN WITH BILATERAL SCIATICA, UNSPECIFIED BACK PAIN LATERALITY: Primary | ICD-10-CM

## 2020-10-26 DIAGNOSIS — Z11.59 NEED FOR HEPATITIS C SCREENING TEST: ICD-10-CM

## 2020-10-26 DIAGNOSIS — E66.3 OVERWEIGHT: ICD-10-CM

## 2020-10-26 DIAGNOSIS — E03.9 HYPOTHYROIDISM, UNSPECIFIED TYPE: ICD-10-CM

## 2020-10-26 PROCEDURE — 99203 OFFICE O/P NEW LOW 30 MIN: CPT | Performed by: FAMILY MEDICINE

## 2020-10-28 ENCOUNTER — TELEPHONE (OUTPATIENT)
Dept: FAMILY MEDICINE CLINIC | Facility: CLINIC | Age: 50
End: 2020-10-28

## 2020-10-28 ENCOUNTER — LAB (OUTPATIENT)
Dept: LAB | Facility: HOSPITAL | Age: 50
End: 2020-10-28

## 2020-10-28 DIAGNOSIS — E78.5 HYPERLIPIDEMIA, UNSPECIFIED HYPERLIPIDEMIA TYPE: ICD-10-CM

## 2020-10-28 DIAGNOSIS — IMO0002 UNCONTROLLED TYPE 2 DIABETES MELLITUS WITH COMPLICATION, WITHOUT LONG-TERM CURRENT USE OF INSULIN: ICD-10-CM

## 2020-10-28 DIAGNOSIS — Z11.59 NEED FOR HEPATITIS C SCREENING TEST: ICD-10-CM

## 2020-10-28 DIAGNOSIS — G89.29 CHRONIC LOW BACK PAIN WITH BILATERAL SCIATICA, UNSPECIFIED BACK PAIN LATERALITY: ICD-10-CM

## 2020-10-28 DIAGNOSIS — E03.9 HYPOTHYROIDISM, UNSPECIFIED TYPE: ICD-10-CM

## 2020-10-28 DIAGNOSIS — M54.41 CHRONIC LOW BACK PAIN WITH BILATERAL SCIATICA, UNSPECIFIED BACK PAIN LATERALITY: ICD-10-CM

## 2020-10-28 DIAGNOSIS — M54.42 CHRONIC LOW BACK PAIN WITH BILATERAL SCIATICA, UNSPECIFIED BACK PAIN LATERALITY: ICD-10-CM

## 2020-10-28 LAB
25(OH)D3 SERPL-MCNC: 32 NG/ML (ref 30–100)
ALBUMIN SERPL-MCNC: 4.2 G/DL (ref 3.5–5.2)
ALBUMIN UR-MCNC: <1.2 MG/DL
ALBUMIN/GLOB SERPL: 1.8 G/DL
ALP SERPL-CCNC: 92 U/L (ref 39–117)
ALT SERPL W P-5'-P-CCNC: 23 U/L (ref 1–41)
ANION GAP SERPL CALCULATED.3IONS-SCNC: 10.6 MMOL/L (ref 5–15)
AST SERPL-CCNC: 16 U/L (ref 1–40)
BASOPHILS # BLD AUTO: 0.06 10*3/MM3 (ref 0–0.2)
BASOPHILS NFR BLD AUTO: 1.4 % (ref 0–1.5)
BILIRUB SERPL-MCNC: 0.8 MG/DL (ref 0–1.2)
BUN SERPL-MCNC: 14 MG/DL (ref 6–20)
BUN/CREAT SERPL: 12.5 (ref 7–25)
CALCIUM SPEC-SCNC: 8.9 MG/DL (ref 8.6–10.5)
CHLORIDE SERPL-SCNC: 103 MMOL/L (ref 98–107)
CHOLEST SERPL-MCNC: 176 MG/DL (ref 0–200)
CO2 SERPL-SCNC: 24.4 MMOL/L (ref 22–29)
CREAT SERPL-MCNC: 1.12 MG/DL (ref 0.76–1.27)
CREAT UR-MCNC: 95.3 MG/DL
DEPRECATED RDW RBC AUTO: 38.5 FL (ref 37–54)
EOSINOPHIL # BLD AUTO: 0.1 10*3/MM3 (ref 0–0.4)
EOSINOPHIL NFR BLD AUTO: 2.3 % (ref 0.3–6.2)
ERYTHROCYTE [DISTWIDTH] IN BLOOD BY AUTOMATED COUNT: 12.2 % (ref 12.3–15.4)
GFR SERPL CREATININE-BSD FRML MDRD: 69 ML/MIN/1.73
GLOBULIN UR ELPH-MCNC: 2.3 GM/DL
GLUCOSE SERPL-MCNC: 215 MG/DL (ref 65–99)
HBA1C MFR BLD: 10 % (ref 4.8–5.6)
HCT VFR BLD AUTO: 43.9 % (ref 37.5–51)
HCV AB SER DONR QL: NORMAL
HDLC SERPL-MCNC: 47 MG/DL (ref 40–60)
HGB BLD-MCNC: 15.5 G/DL (ref 13–17.7)
IMM GRANULOCYTES # BLD AUTO: 0.04 10*3/MM3 (ref 0–0.05)
IMM GRANULOCYTES NFR BLD AUTO: 0.9 % (ref 0–0.5)
LDLC SERPL CALC-MCNC: 109 MG/DL (ref 0–100)
LDLC/HDLC SERPL: 2.26 {RATIO}
LYMPHOCYTES # BLD AUTO: 1.7 10*3/MM3 (ref 0.7–3.1)
LYMPHOCYTES NFR BLD AUTO: 38.5 % (ref 19.6–45.3)
MCH RBC QN AUTO: 30.6 PG (ref 26.6–33)
MCHC RBC AUTO-ENTMCNC: 35.3 G/DL (ref 31.5–35.7)
MCV RBC AUTO: 86.6 FL (ref 79–97)
MICROALBUMIN/CREAT UR: NORMAL MG/G{CREAT}
MONOCYTES # BLD AUTO: 0.33 10*3/MM3 (ref 0.1–0.9)
MONOCYTES NFR BLD AUTO: 7.5 % (ref 5–12)
NEUTROPHILS NFR BLD AUTO: 2.18 10*3/MM3 (ref 1.7–7)
NEUTROPHILS NFR BLD AUTO: 49.4 % (ref 42.7–76)
NRBC BLD AUTO-RTO: 0 /100 WBC (ref 0–0.2)
PLATELET # BLD AUTO: 202 10*3/MM3 (ref 140–450)
PMV BLD AUTO: 12.3 FL (ref 6–12)
POTASSIUM SERPL-SCNC: 4.8 MMOL/L (ref 3.5–5.2)
PROT SERPL-MCNC: 6.5 G/DL (ref 6–8.5)
RBC # BLD AUTO: 5.07 10*6/MM3 (ref 4.14–5.8)
SODIUM SERPL-SCNC: 138 MMOL/L (ref 136–145)
T3FREE SERPL-MCNC: 2.77 PG/ML (ref 2–4.4)
T4 FREE SERPL-MCNC: 1.02 NG/DL (ref 0.93–1.7)
TRIGL SERPL-MCNC: 113 MG/DL (ref 0–150)
TSH SERPL DL<=0.05 MIU/L-ACNC: 5.99 UIU/ML (ref 0.27–4.2)
VIT B12 BLD-MCNC: 524 PG/ML (ref 211–946)
VLDLC SERPL-MCNC: 20 MG/DL (ref 5–40)
WBC # BLD AUTO: 4.41 10*3/MM3 (ref 3.4–10.8)

## 2020-10-28 PROCEDURE — 86803 HEPATITIS C AB TEST: CPT

## 2020-10-28 PROCEDURE — 84439 ASSAY OF FREE THYROXINE: CPT

## 2020-10-28 PROCEDURE — 36415 COLL VENOUS BLD VENIPUNCTURE: CPT

## 2020-10-28 PROCEDURE — 85025 COMPLETE CBC W/AUTO DIFF WBC: CPT

## 2020-10-28 PROCEDURE — 83036 HEMOGLOBIN GLYCOSYLATED A1C: CPT

## 2020-10-28 PROCEDURE — 82306 VITAMIN D 25 HYDROXY: CPT

## 2020-10-28 PROCEDURE — 80053 COMPREHEN METABOLIC PANEL: CPT

## 2020-10-28 PROCEDURE — 82043 UR ALBUMIN QUANTITATIVE: CPT

## 2020-10-28 PROCEDURE — 84443 ASSAY THYROID STIM HORMONE: CPT

## 2020-10-28 PROCEDURE — 80061 LIPID PANEL: CPT

## 2020-10-28 PROCEDURE — 82570 ASSAY OF URINE CREATININE: CPT

## 2020-10-28 PROCEDURE — 82607 VITAMIN B-12: CPT

## 2020-10-28 PROCEDURE — 84481 FREE ASSAY (FT-3): CPT

## 2020-11-02 ENCOUNTER — TELEPHONE (OUTPATIENT)
Dept: FAMILY MEDICINE CLINIC | Facility: CLINIC | Age: 50
End: 2020-11-02

## 2020-11-02 RX ORDER — ASPIRIN 81 MG/1
81 TABLET ORAL DAILY
Qty: 30 TABLET | Refills: 3 | Status: SHIPPED | OUTPATIENT
Start: 2020-11-02

## 2020-11-02 RX ORDER — ERGOCALCIFEROL 1.25 MG/1
50000 CAPSULE ORAL
Qty: 4 CAPSULE | Refills: 3 | Status: SHIPPED | OUTPATIENT
Start: 2020-11-02

## 2020-11-02 RX ORDER — ATORVASTATIN CALCIUM 20 MG/1
20 TABLET, FILM COATED ORAL NIGHTLY
Qty: 30 TABLET | Refills: 3 | Status: SHIPPED | OUTPATIENT
Start: 2020-11-02

## 2020-11-02 RX ORDER — LEVOTHYROXINE SODIUM 0.03 MG/1
25 TABLET ORAL DAILY
Qty: 30 TABLET | Refills: 3 | Status: SHIPPED | OUTPATIENT
Start: 2020-11-02

## 2020-11-02 NOTE — TELEPHONE ENCOUNTER
----- Message from Star Solis MD sent at 10/31/2020 10:45 PM CDT -----  Pleas call pt     Hep C antibody test negative    On thyroid studies TSH high at 5.990 with normal T4 and T3 pt has subclinical hypothyroidism and recommend starting on synthroid 25 mcg PO q daily give 30 pills and 3 refills. Will help with fatigue     On CMP kidney function shows GFR at 69.  Pt may have CKD stage 2 due to diabetes. Liver enzymes normal. Glucose >200     On lipid panel LDL elevated at 109 and highly recommend pt start taking lipitor 20 mg PO qhs. Give 30 pills and 3 refills.  This will help prevent cardiovascular disease  Also recommend pt start taking aspirin 81 mg pO q daily to prevent cardiovascular event. Give 30 pills and 3 refills    Vitamin D levels low normal and recommend pt start taking vitamin D3 50,000 units once a week give 4 pills and 3 refills. Will help with fatigue     Vitamin B12 levels normal     hga1c at 10.00  And uncontrolled. Goal <7.0.  Pt will need to be on insulin and will discuss next vsiit. Recommend pt start taking januvia 100 mg PO q daily to help lower sugars. If pt agreeable give 30 pills and 3 refills. Will help insulin regulate sugars. Also recommend pt start jardiance 25 mg PO q daily if insurance is willing. Will help urinate sugars but also lower risk of cardiovascular death. Give 30 pills and 3 refills.  Needs to drink a lot of water while taking this or it may cause dehydration. Also may cause UTI or genital yeast infection. Pt needs to start checking sugars and goal in mornings  and 2 hours after meal <180    On CBC hemoglobin stable     Recheck on next visit. Thanks

## 2020-11-02 NOTE — TELEPHONE ENCOUNTER
Gave pt results and recommendations. Pt voiced understanding and is agreeable to all medications.

## 2020-11-03 NOTE — PROGRESS NOTES
Subjective:  Manohar Marie is a 50 y.o. male who presents for diabetes type 2       Patient Active Problem List   Diagnosis   • Erectile dysfunction   • Hypothyroidism   • Hyperlipidemia   • Uncontrolled type 2 diabetes mellitus without complication, without long-term current use of insulin   • Left arm pain   • Chronic left shoulder pain   • Low back pain   • Uncontrolled type 2 diabetes mellitus with complication, without long-term current use of insulin (CMS/HCC)   • Acute bronchitis   • Rash   • Chronic low back pain with bilateral sciatica   • Overweight   • Vitamin D deficiency           Current Outpatient Medications:   •  aspirin (aspirin) 81 MG EC tablet, Take 1 tablet by mouth Daily., Disp: 30 tablet, Rfl: 3  •  atorvastatin (Lipitor) 20 MG tablet, Take 1 tablet by mouth Every Night., Disp: 30 tablet, Rfl: 3  •  Empagliflozin 25 MG tablet, Take 25 mg by mouth Daily., Disp: 30 tablet, Rfl: 3  •  levothyroxine (SYNTHROID, LEVOTHROID) 25 MCG tablet, Take 1 tablet by mouth Daily., Disp: 30 tablet, Rfl: 3  •  meloxicam (Mobic) 15 MG tablet, Take 1 tablet by mouth Daily., Disp: 60 tablet, Rfl: 0  •  SITagliptin (Januvia) 100 MG tablet, Take 1 tablet by mouth Daily., Disp: 30 tablet, Rfl: 3  •  vitamin D (ERGOCALCIFEROL) 1.25 MG (40288 UT) capsule capsule, Take 1 capsule by mouth Every 7 (Seven) Days., Disp: 4 capsule, Rfl: 3    HPI       Pt is 51 yo male with management of obesity, DM type 2,  Subclinical hypothyroidism, ED, HLP, sp kidney stone surgery, chronic back pain, Vitamin D deficiency      10/26/20 pt is here to reestablish. I have not seen pt since 2017. Pt has history of DM Type 2, hypothyroidism. Recently saw walk in clinic  With Dr. Singh on 10/8/20 for back pain. He was referred to PT/OT and restarted on mobic. He was also ordered X-ray of lumbar spine that was orderd on 10/8/20 and has yet to be completed.  He stoped taking his medications for HLP, DM type 2   He stopped taking his  medication over a year ago. He states he feels fatigued.   He works 5 days a week parttime.  He lifts boxes every day.  His back is feeling somewhat better .      11/19/20 in office visit for recheck on pt's above medical issues. Pt had labowrk done on 10/28/20 that showed hep C antibody test negative. Vitamin B12 levels normal vitamin D was low normal at 32.0. Thyroid studies showed elevated TSH at 5.990 with normal T3 and T4.  Lipid panel showed LDL at 109. hga1c at 10.0 with CMP showing GFR at 69 and glucose >200.  CBC showed normal hemoglobin. He is eating better and watching his diet. He is exercising. He is taking januvia, jardiance along with HLP medication and thyroid medication. He feels better            Diabetes  He presents for his follow-up diabetic visit. His disease course has been fluctuating. Pertinent negatives for hypoglycemia include no confusion, dizziness, headaches, hunger, mood changes, nervousness/anxiousness, pallor, seizures or tremors. Associated symptoms include weakness. Pertinent negatives for diabetes include no chest pain, no fatigue, no visual change and no weight loss. Pertinent negatives for hypoglycemia complications include no blackouts and no hospitalization. Symptoms are stable. Pertinent negatives for diabetic complications include no autonomic neuropathy or heart disease. Risk factors for coronary artery disease include dyslipidemia, diabetes mellitus, obesity, sedentary lifestyle and stress. He is compliant with treatment most of the time. He is following a generally unhealthy diet. When asked about meal planning, he reported none. An ACE inhibitor/angiotensin II receptor blocker is not being taken. He does not see a podiatrist.Eye exam is not current.   Thyroid Problem  Presents for follow-up visit. Patient reports no anxiety, cold intolerance, constipation, depressed mood, diaphoresis, diarrhea, dry skin, fatigue, hair loss, heat intolerance, hoarse voice, leg swelling,  menstrual problem, nail problem, palpitations, tremors, visual change, weight gain or weight loss.   Back Pain  This is a chronic problem. The current episode started more than 1 year ago. The problem occurs constantly. The problem is unchanged. The pain is present in the lumbar spine. The quality of the pain is described as aching. The pain is at a severity of 3/10. The pain is mild. The pain is the same all the time. The symptoms are aggravated by bending, lying down, position and twisting. Associated symptoms include numbness and weakness. Pertinent negatives include no abdominal pain, bladder incontinence, bowel incontinence, chest pain, dysuria, fever, headaches, leg pain, paresis, paresthesias, pelvic pain, perianal numbness, tingling or weight loss. He has tried NSAIDs for the symptoms. The treatment provided mild relief.     Review of Systems  Review of Systems   Constitutional: Positive for activity change and fatigue. Negative for appetite change, chills, diaphoresis and fever.   HENT: Negative for congestion, postnasal drip, rhinorrhea, sinus pressure, sinus pain, sneezing, sore throat, trouble swallowing and voice change.    Respiratory: Negative for cough, choking, chest tightness, shortness of breath, wheezing and stridor.    Cardiovascular: Negative for chest pain.   Gastrointestinal: Negative for diarrhea, nausea and vomiting.   Musculoskeletal: Positive for arthralgias.   Neurological: Positive for weakness and numbness. Negative for headaches.       Patient Active Problem List   Diagnosis   • Erectile dysfunction   • Hypothyroidism   • Hyperlipidemia   • Uncontrolled type 2 diabetes mellitus without complication, without long-term current use of insulin   • Left arm pain   • Chronic left shoulder pain   • Low back pain   • Uncontrolled type 2 diabetes mellitus with complication, without long-term current use of insulin (CMS/[facility])   • Acute bronchitis   • Rash   • Chronic low back pain with bilateral  sciatica   • Overweight   • Vitamin D deficiency     Past Surgical History:   Procedure Laterality Date   • KIDNEY STONE SURGERY       Social History     Socioeconomic History   • Marital status: Single     Spouse name: Not on file   • Number of children: Not on file   • Years of education: Not on file   • Highest education level: Not on file   Tobacco Use   • Smoking status: Never Smoker   • Smokeless tobacco: Never Used   Substance and Sexual Activity   • Alcohol use: No   • Drug use: No   • Sexual activity: Defer     Family History   Problem Relation Age of Onset   • Cancer Mother    • Diabetes Father      Lab on 10/28/2020   Component Date Value Ref Range Status   • WBC 10/28/2020 4.41  3.40 - 10.80 10*3/mm3 Final   • RBC 10/28/2020 5.07  4.14 - 5.80 10*6/mm3 Final   • Hemoglobin 10/28/2020 15.5  13.0 - 17.7 g/dL Final   • Hematocrit 10/28/2020 43.9  37.5 - 51.0 % Final   • MCV 10/28/2020 86.6  79.0 - 97.0 fL Final   • MCH 10/28/2020 30.6  26.6 - 33.0 pg Final   • MCHC 10/28/2020 35.3  31.5 - 35.7 g/dL Final   • RDW 10/28/2020 12.2* 12.3 - 15.4 % Final   • RDW-SD 10/28/2020 38.5  37.0 - 54.0 fl Final   • MPV 10/28/2020 12.3* 6.0 - 12.0 fL Final   • Platelets 10/28/2020 202  140 - 450 10*3/mm3 Final   • Neutrophil % 10/28/2020 49.4  42.7 - 76.0 % Final   • Lymphocyte % 10/28/2020 38.5  19.6 - 45.3 % Final   • Monocyte % 10/28/2020 7.5  5.0 - 12.0 % Final   • Eosinophil % 10/28/2020 2.3  0.3 - 6.2 % Final   • Basophil % 10/28/2020 1.4  0.0 - 1.5 % Final   • Immature Grans % 10/28/2020 0.9* 0.0 - 0.5 % Final   • Neutrophils, Absolute 10/28/2020 2.18  1.70 - 7.00 10*3/mm3 Final   • Lymphocytes, Absolute 10/28/2020 1.70  0.70 - 3.10 10*3/mm3 Final   • Monocytes, Absolute 10/28/2020 0.33  0.10 - 0.90 10*3/mm3 Final   • Eosinophils, Absolute 10/28/2020 0.10  0.00 - 0.40 10*3/mm3 Final   • Basophils, Absolute 10/28/2020 0.06  0.00 - 0.20 10*3/mm3 Final   • Immature Grans, Absolute 10/28/2020 0.04  0.00 - 0.05 10*3/mm3  Final   • nRBC 10/28/2020 0.0  0.0 - 0.2 /100 WBC Final   • Glucose 10/28/2020 215* 65 - 99 mg/dL Final   • BUN 10/28/2020 14  6 - 20 mg/dL Final   • Creatinine 10/28/2020 1.12  0.76 - 1.27 mg/dL Final   • Sodium 10/28/2020 138  136 - 145 mmol/L Final   • Potassium 10/28/2020 4.8  3.5 - 5.2 mmol/L Final   • Chloride 10/28/2020 103  98 - 107 mmol/L Final   • CO2 10/28/2020 24.4  22.0 - 29.0 mmol/L Final   • Calcium 10/28/2020 8.9  8.6 - 10.5 mg/dL Final   • Total Protein 10/28/2020 6.5  6.0 - 8.5 g/dL Final   • Albumin 10/28/2020 4.20  3.50 - 5.20 g/dL Final   • ALT (SGPT) 10/28/2020 23  1 - 41 U/L Final   • AST (SGOT) 10/28/2020 16  1 - 40 U/L Final   • Alkaline Phosphatase 10/28/2020 92  39 - 117 U/L Final   • Total Bilirubin 10/28/2020 0.8  0.0 - 1.2 mg/dL Final   • eGFR Non African Amer 10/28/2020 69  >60 mL/min/1.73 Final   • Globulin 10/28/2020 2.3  gm/dL Final   • A/G Ratio 10/28/2020 1.8  g/dL Final   • BUN/Creatinine Ratio 10/28/2020 12.5  7.0 - 25.0 Final   • Anion Gap 10/28/2020 10.6  5.0 - 15.0 mmol/L Final   • Hemoglobin A1C 10/28/2020 10.00* 4.80 - 5.60 % Final   • Total Cholesterol 10/28/2020 176  0 - 200 mg/dL Final   • Triglycerides 10/28/2020 113  0 - 150 mg/dL Final   • HDL Cholesterol 10/28/2020 47  40 - 60 mg/dL Final   • LDL Cholesterol  10/28/2020 109* 0 - 100 mg/dL Final   • VLDL Cholesterol 10/28/2020 20  5 - 40 mg/dL Final   • LDL/HDL Ratio 10/28/2020 2.26   Final   • TSH 10/28/2020 5.990* 0.270 - 4.200 uIU/mL Final   • Free T4 10/28/2020 1.02  0.93 - 1.70 ng/dL Final   • T3, Free 10/28/2020 2.77  2.00 - 4.40 pg/mL Final   • 25 Hydroxy, Vitamin D 10/28/2020 32.0  30.0 - 100.0 ng/ml Final   • Vitamin B-12 10/28/2020 524  211 - 946 pg/mL Final   • Microalbumin/Creatinine Ratio 10/28/2020    Final    Unable to calculate   • Creatinine, Urine 10/28/2020 95.3  mg/dL Final   • Microalbumin, Urine 10/28/2020 <1.2  mg/dL Final   • Hepatitis C Ab 10/28/2020 Non-Reactive  Non-Reactive Final      XR  "Spine Lumbar 4+ View (In Office)  Narrative: XR LUMBAR SPINE 4 OR MORE VIEWS: 10/26/2020 1:40 PM CDT    CLINICAL HISTORY:  Lower back pain.    COMPARISON: None.    FINDINGS:   There is no fracture, bone destruction, or spondylolysis.  The  disc space heights are preserved.  The alignment is maintained  normally without any listhesis.  Impression: Normal lumbosacral spine.    Electronically signed by:  Yaakov Tee MD  10/26/2020 4:42 PM CDT  Workstation: [email or web address]    [email or web address]    There is no immunization history on file for this patient.    The following portions of the patient's history were reviewed and updated as appropriate: allergies, current medications, past family history, past medical history, past social history, past surgical history and problem list.        Physical Exam  /70 (BP Location: Left arm, Patient Position: Sitting, Cuff Size: Adult)   Pulse 76   Temp 98 °F (36.7 °C)   Ht 182.9 cm (72\")   Wt 100 kg (221 lb)   SpO2 99%   BMI 29.97 kg/m² \  Physical Exam  Vitals signs and nursing note reviewed.   Constitutional:       Appearance: He is well-developed. He is not diaphoretic.   HENT:      Head: Normocephalic and atraumatic.      Right Ear: External ear normal.   Eyes:      Conjunctiva/sclera: Conjunctivae normal.      Pupils: Pupils are equal, round, and reactive to light.   Neck:      Musculoskeletal: Normal range of motion and neck supple.   Cardiovascular:      Rate and Rhythm: Normal rate and regular rhythm.      Heart sounds: Normal heart sounds. No murmur.   Pulmonary:      Effort: Pulmonary effort is normal. No respiratory distress.      Breath sounds: Normal breath sounds.   Abdominal:      General: Bowel sounds are normal. There is no distension.      Palpations: Abdomen is soft.      Tenderness: There is no abdominal tenderness.   Musculoskeletal: Normal range of motion.         General: Tenderness present. No deformity.   Feet:      Right foot:      Skin integrity: Dry " skin present. No ulcer, blister, skin breakdown, erythema, warmth, callus or fissure.      Left foot:      Skin integrity: Dry skin present. No ulcer, blister, skin breakdown, erythema, warmth, callus or fissure.   Skin:     General: Skin is warm.      Coloration: Skin is not pale.      Findings: No erythema or rash.   Neurological:      Mental Status: He is alert and oriented to person, place, and time.      Cranial Nerves: No cranial nerve deficit.   Psychiatric:         Behavior: Behavior normal.         Assessment/Plan    Diagnosis Plan   1. Uncontrolled type 2 diabetes mellitus with complication, without long-term current use of insulin (CMS/[facility])     2. Mixed hyperlipidemia     3. Hypothyroidism, unspecified type     4. Overweight     5. Vitamin D deficiency            -went over labwork  -recommend colonoscopy - pt declined  He will get cologuard   -annual physical exam today   -recommend diabetic eye exam   -recommend pneumonia vaccination /tdap vaccination   -recommend influenza vaccination  -overweight - counseled weight loss >5 minutes BMI at 29.84.   -hypothyroidism - on synthroid 25 mcg PO q daily.   -HLP -on lipitor 20 mg PO qhs   -DM type 2 - on januvia 100 mg PO q daily on jardiance 25 mg PO q daily. Recommend starting on insulin but pt declined. He will try his best with diet, medications and exercise   -vitamin D deficiency - on vitamin D3 50,000 units once a week   -back pain - on mobic/ referred to PT/OT.  X-ray of lumbar spine ordere.    -advised pt to be safe and call with questions and concerns  -advsied pt to go to ER or call 911 if symptoms worrisome or severe  -advised pt to followup with specialist and referrals  -advised pt to be safe during COVID-19 pandemic  -total time with pt >25 minutes   -recheck in 6 weeks         This document has been electronically signed by Star Solis MD on November 19, 2020 14:32 CST              \

## 2020-11-19 ENCOUNTER — OFFICE VISIT (OUTPATIENT)
Dept: FAMILY MEDICINE CLINIC | Facility: CLINIC | Age: 50
End: 2020-11-19

## 2020-11-19 VITALS
SYSTOLIC BLOOD PRESSURE: 118 MMHG | TEMPERATURE: 98 F | DIASTOLIC BLOOD PRESSURE: 70 MMHG | BODY MASS INDEX: 29.93 KG/M2 | HEIGHT: 72 IN | HEART RATE: 76 BPM | WEIGHT: 221 LBS | OXYGEN SATURATION: 99 %

## 2020-11-19 DIAGNOSIS — Z12.11 SPECIAL SCREENING FOR MALIGNANT NEOPLASMS, COLON: Primary | ICD-10-CM

## 2020-11-19 DIAGNOSIS — E03.9 HYPOTHYROIDISM, UNSPECIFIED TYPE: ICD-10-CM

## 2020-11-19 DIAGNOSIS — E66.3 OVERWEIGHT: ICD-10-CM

## 2020-11-19 DIAGNOSIS — E55.9 VITAMIN D DEFICIENCY: ICD-10-CM

## 2020-11-19 DIAGNOSIS — IMO0002 UNCONTROLLED TYPE 2 DIABETES MELLITUS WITH COMPLICATION, WITHOUT LONG-TERM CURRENT USE OF INSULIN: ICD-10-CM

## 2020-11-19 DIAGNOSIS — E78.2 MIXED HYPERLIPIDEMIA: ICD-10-CM

## 2020-11-19 PROCEDURE — 99214 OFFICE O/P EST MOD 30 MIN: CPT | Performed by: FAMILY MEDICINE

## 2020-11-19 NOTE — PATIENT INSTRUCTIONS
Recheck in 6 weeks    Get diabetic eye exam with Dr. Grace. Please have her fax results to my office     Bring blood sugar reaginds before breakfast and 2 hours after meal    Goal before breakfast   2 hours after lunch of dinner <180          Vitamin D Deficiency  Vitamin D deficiency is when your body does not have enough vitamin D. Vitamin D is important to your body for many reasons:  · It helps the body absorb two important minerals--calcium and phosphorus.  · It plays a role in bone health.  · It may help to prevent some diseases, such as diabetes and multiple sclerosis.  · It plays a role in muscle function, including heart function.  If vitamin D deficiency is severe, it can cause a condition in which your bones become soft. In adults, this condition is called osteomalacia. In children, this condition is called rickets.  What are the causes?  This condition may be caused by:  · Not eating enough foods that contain vitamin D.  · Not getting enough natural sun exposure.  · Having certain digestive system diseases that make it difficult for your body to absorb vitamin D. These diseases include Crohn's disease, chronic pancreatitis, and cystic fibrosis.  · Having a surgery in which a part of the stomach or a part of the small intestine is removed.  · Having chronic kidney disease or liver disease.  What increases the risk?  You are more likely to develop this condition if you:  · Are older.  · Do not spend much time outdoors.  · Live in a long-term care facility.  · Have had broken bones.  · Have weak or thin bones (osteoporosis).  · Have a disease or condition that changes how the body absorbs vitamin D.  · Have dark skin.  · Take certain medicines, such as steroid medicines or certain seizure medicines.  · Are overweight or obese.  What are the signs or symptoms?  In mild cases of vitamin D deficiency, there may not be any symptoms. If the condition is severe, symptoms may include:  · Bone  pain.  · Muscle pain.  · Falling often.  · Broken bones caused by a minor injury.  How is this diagnosed?  This condition may be diagnosed with blood tests. Imaging tests such as X-rays may also be done to look for changes in the bone.  How is this treated?  Treatment for this condition may depend on what caused the condition. Treatment options include:  · Taking vitamin D supplements. Your health care provider will suggest what dose is best for you.  · Taking a calcium supplement. Your health care provider will suggest what dose is best for you.  Follow these instructions at home:  Eating and drinking    · Eat foods that contain vitamin D. Choices include:  ? Fortified dairy products, cereals, or juices. Fortified means that vitamin D has been added to the food. Check the label on the package to see if the food is fortified.  ? Fatty fish, such as salmon or trout.  ? Eggs.  ? Oysters.  ? Mushrooms.  The items listed above may not be a complete list of recommended foods and beverages. Contact a dietitian for more information.  General instructions  · Take medicines and supplements only as told by your health care provider.  · Get regular, safe exposure to natural sunlight.  · Do not use a tanning bed.  · Maintain a healthy weight. Lose weight if needed.  · Keep all follow-up visits as told by your health care provider. This is important.  How is this prevented?  You can get vitamin D by:  · Eating foods that naturally contain vitamin D.  · Eating or drinking products that have been fortified with vitamin D, such as cereals, juices, and dairy products (including milk).  · Taking a vitamin D supplement or a multivitamin supplement that contains vitamin D.  · Being in the sun. Your body naturally makes vitamin D when your skin is exposed to sunlight. Your body changes the sunlight into a form of the vitamin that it can use.  Contact a health care provider if:  · Your symptoms do not go away.  · You feel nauseous or you  "vomit.  · You have fewer bowel movements than usual or are constipated.  Summary  · Vitamin D deficiency is when your body does not have enough vitamin D.  · Vitamin D is important to your body for good bone health and muscle function, and it may help prevent some diseases.  · Vitamin D deficiency is primarily treated through supplementation. Your health care provider will suggest what dose is best for you.  · You can get vitamin D by eating foods that contain vitamin D, by being in the sun, and by taking a vitamin D supplement or a multivitamin supplement that contains vitamin D.  This information is not intended to replace advice given to you by your health care provider. Make sure you discuss any questions you have with your health care provider.  Document Released: 03/11/2013 Document Revised: 08/26/2019 Document Reviewed: 08/26/2019  Elsevier Patient Education © 2020 [employer]    Insulin Storage and Care    All insulin pens and bottles (vials) have expiration dates. Insulin pens and vials that are refrigerated and unopened are good until the expiration date. Once opened, vials are good for 28 days. \"Opened\" means that the rubber has been punctured. Once in use, pen expiration dates vary depending on the type of insulin being used. Do not use insulin after the expiration date. Always follow the instructions that come with your insulin.  How to store your insulin  · Store your insulin according to instructions on the packaging.  · If insulin is kept at room temperature, keep the temperature between 56-80°F (13-27°C).  ? Opened insulin vials may be kept at room temperature, or in the refrigerator and warmed to room temperature before use.  ? Opened insulin pens should be kept at room temperature.  · If insulin is kept in the refrigerator, keep the temperature between 36-46°F (3-8°C).  · Do not freeze insulin.  · Keep insulin away from direct heat or sunlight.  How to throw away your supplies  · Throw away your " "insulin if:  ? It is discolored.  ? It is thick.  ? It has clumps in it.  ? It has white particles suspended in it.  · Discard all used needles in a puncture-proof sharps disposal container. You can ask your local pharmacy about where you can get this kind of disposal container, or you can use an empty liquid laundry detergent bottle that has a lid, for example.  · Follow the disposal regulations for the area where you live.  · Do not use any syringe or needle more than one time.  · Throw away empty vials and pens (with needles removed) in the regular trash.  General recommendations  · Always keep extra insulin and supplies with you.  · Always inspect your insulin prior to injecting. Do not use if you notice any discoloration, particles, or clumping.  · Mix cloudy insulin by gently rolling the vial between your hands or \"rocking\" the pen from end to end at least 10 times.  · Do not leave insulin in your vehicle or in any place where it can get too hot or too cold.  · Use an insulated travel pack to store your insulin vials or pens when traveling.  Where to find more information  · American Diabetes Association: www.diabetes.org  · National [place] of Diabetes and Digestive and Kidney Diseases: www.niddk.nih.gov/health-information/diabetes/overview  Summary  · Do not store insulin in extreme heat or cold, such as in the freezer, direct sunlight, or your vehicle.  · Check the expiration date before using insulin and do not use insulin past the expiration date.  · Check insulin before using to make sure it looks normal. Mix cloudy insulin before using. Do not use insulin if it is discolored, has particles, or clumps.  This information is not intended to replace advice given to you by your health care provider. Make sure you discuss any questions you have with your health care provider.  Document Released: 10/15/2010 Document Revised: 01/25/2018 Document Reviewed: 01/25/2018  Elsevier Patient Education © 2020 Elsevier " Inc.    Insulin Treatment for Diabetes Mellitus  Diabetes (diabetes mellitus) is a long-term (chronic) disease. It occurs when the body does not properly use sugar (glucose) that is released from food after digestion. Glucose levels are controlled by a hormone called insulin. Insulin is made in the pancreas, which is an organ behind the stomach.  · If you have type 1 diabetes, you must take insulin because your pancreas does not make any.  · If you have type 2 diabetes, you might need to take insulin along with other medicines. In type 2 diabetes, one or both of these problems may be present:  ? The pancreas does not make enough insulin.  ? Cells in the body do not respond properly to insulin that the body makes (insulin resistance).  You must use insulin correctly to control your diabetes. You must have some insulin in your body at all times. Insulin treatment varies depending on your type of diabetes, your treatment goals, and your medical history. Ask questions to understand your insulin treatment plan so you can be an active partner in managing your diabetes.  How is insulin given?  Insulin can only be given through a shot (injection). It is injected using a syringe and needle, an insulin pen, a pump, or a jet injector. Your health care provider will:  · Prescribe the type and amount of insulin that you need.  · Tell you when you should inject your insulin.  Where on the body should insulin be injected?  Insulin is injected into a layer of fatty tissue under the skin. Good places to inject insulin include:  · Abdomen. Generally, the abdomen is the best place to inject insulin. However, you should avoid any area that is less than 2 inches (5 cm) from the belly button (navel).  · Front of thigh.  · Upper, outer side of thigh.  · Upper, outer side of arm.  · Upper, outer part of buttock.  It is important to:  · Give your injection in a slightly different place each time. This helps to prevent irritation and improve  absorption.  · Avoid injecting into areas that have scar tissue.  Usually, you will give yourself insulin injections. Others can also be taught how to give you injections. You will use a special type of syringe that is made only for insulin. Some people may have an insulin pump that delivers insulin steadily through a tube (cannula) that is placed under the skin.  What are the different types of insulin?  The following information is a general guide to different types of insulin. Specifics vary depending on the insulin product that your health care provider prescribes.  · Rapid-acting insulin:  ? Starts working quickly, in as little as 5 minutes.  ? Can last for 4-6 hours (or sometimes longer).  ? Works well when taken right before a meal to quickly lower your blood glucose.  · Short-acting insulin:  ? Starts working in about 30 minutes.  ? Can last for 6-10 hours.  ? Should be taken about 30 minutes before you start eating a meal.  · Intermediate-acting insulin:  ? Starts working in 1-2 hours.  ? Lasts for about 10-18 hours.  ? Lowers your blood glucose for a longer period of time, but it is not as effective for lowering blood glucose right after a meal.  · Long-acting insulin:  ? Mimics the small amount of insulin that your pancreas usually produces throughout the day.  ? Should be used one or two times a day.  ? Is usually used in combination with other types of insulin or other medicines.  · Concentrated insulin, or U-500 insulin:  ? Contains a higher dose of insulin than most rapid-acting insulins. U-500 insulin has 5 times the amount of insulin per 1 mL.  ? Should only be used with the special U-500 syringe or U-500 insulin pen. It is dangerous to use the wrong type of syringe with this insulin.  What are the side effects of insulin?  Possible side effects of insulin treatment include:  · Low blood glucose (hypoglycemia).  · Weight gain.  · High blood glucose (hyperglycemia).  · Skin injury or  irritation.  Some of these side effects can be caused by using improper injection technique. Be sure to learn how to inject insulin properly.  What are common terms associated with insulin treatment?  Some terms that you might hear include:  · Basal insulin, or basal rate. This is the constant amount of insulin that needs to be present in your body to keep your blood glucose levels stable. People who have type 1 diabetes need basal insulin in a steady (continuous) dose 24 hours a day.  ? Usually, intermediate-acting or long-acting insulin is used one or two times a day to manage basal insulin levels.  ? Medicines that are taken by mouth may also be recommended to manage basal insulin levels.  · Prandial or nutrition insulin. This refers to meal-related insulin.  ? Blood glucose rises quickly after a meal (postprandial). Rapid-acting or short-acting insulin can be used right before a meal (preprandial) to quickly lower your blood glucose.  ? You may be instructed to adjust the amount of prandial insulin that you take, based on how much carbohydrate (starch) is in your meal.  · Corrective insulin. This may also be called a correction dose or supplemental dose. This is a small amount of rapid-acting or short-acting insulin that can be used to lower your blood glucose if it is too high. You may be instructed to check your blood glucose at certain times of the day and use corrective insulin as needed.  · Tight control, or intensive therapy. This means keeping your blood glucose as close to your target as possible, and preventing your blood glucose from getting too high after meals. People who have tight control of their diabetes have fewer long-term problems caused by diabetes.  Follow these instructions at home:  Talk with your health care provider or pharmacist about the type of insulin you should take and when you should take it. You should know when your insulin goes up the most (peaks) and when it wears off. You  need this information so you can plan your meals and exercise. Work with your health care provider to:  · Check your blood glucose every day. Your health care provider will tell you how often and when you should do this.  · Manage your:  ? Weight.  ? Blood pressure.  ? Cholesterol.  ? Stress.  · Eat a healthy diet.  · Exercise regularly.  Summary  · Diabetes is a long-term (chronic) disease. It occurs when the body does not properly use sugar (glucose) that is released from food after digestion. Glucose levels are controlled by a hormone called insulin, which is made in an organ behind your stomach (pancreas).  · You must use insulin correctly to control your diabetes. You must have some insulin in your body at all times.  · Insulin treatment varies depending on your type of diabetes, your treatment goals, and your medical history.  · Talk with your health care provider or pharmacist about the type of insulin you should take and when you should take it.  · Check your blood glucose every day. Your health care provider will tell you how often and when you should check it.  This information is not intended to replace advice given to you by your health care provider. Make sure you discuss any questions you have with your health care provider.  Document Released: 03/16/2010 Document Revised: 12/21/2018 Document Reviewed: 01/20/2017  [employer] Patient Education © 2020 Else[employer] Inc.    Hyperglycemia  Hyperglycemia occurs when the level of sugar (glucose) in the blood is too high. Glucose is a type of sugar that provides the body's main source of energy. Certain hormones (insulin and glucagon) control the level of glucose in the blood. Insulin lowers blood glucose, and glucagon increases blood glucose. Hyperglycemia can result from having too little insulin in the bloodstream, or from the body not responding normally to insulin.  Hyperglycemia occurs most often in people who have diabetes (diabetes mellitus), but it can  happen in people who do not have diabetes. It can develop quickly, and it can be life-threatening if it causes you to become severely dehydrated (diabetic ketoacidosis or hyperglycemic hyperosmolar state). Severe hyperglycemia is a medical emergency.  What are the causes?  If you have diabetes, hyperglycemia may be caused by:  · Diabetes medicine.  · Medicines that increase blood glucose or affect your diabetes control.  · Not eating enough, or not eating often enough.  · Changes in physical activity level.  · Being sick or having an infection.  If you have prediabetes or undiagnosed diabetes:  · Hyperglycemia may be caused by those conditions.  If you do not have diabetes, hyperglycemia may be caused by:  · Certain medicines, including steroid medicines, beta-blockers, epinephrine, and thiazide diuretics.  · Stress.  · Serious illness.  · Surgery.  · Diseases of the pancreas.  · Infection.  What increases the risk?  Hyperglycemia is more likely to develop in people who have risk factors for diabetes, such as:  · Having a family member with diabetes.  · Having a gene for type 1 diabetes that is passed from parent to child (inherited).  · Living in an area with cold weather conditions.  · Exposure to certain viruses.  · Certain conditions in which the body's disease-fighting (immune) system attacks itself (autoimmune disorders).  · Being overweight or obese.  · Having an inactive (sedentary) lifestyle.  · Having been diagnosed with insulin resistance.  · Having a history of prediabetes, gestational diabetes, or polycystic ovarian syndrome (PCOS).  · Being of American-[place], -American, /, or / descent.  What are the signs or symptoms?  Hyperglycemia may not cause any symptoms. If you do have symptoms, they may include early warning signs, such as:  · Increased thirst.  · Hunger.  · Feeling very tired.  · Needing to urinate more often than usual.  · Blurry vision.  Other  symptoms may develop if hyperglycemia gets worse, such as:  · Dry mouth.  · Loss of appetite.  · Fruity-smelling breath.  · Weakness.  · Unexpected or rapid weight gain or weight loss.  · Tingling or numbness in the hands or feet.  · Headache.  · Skin that does not quickly return to normal after being lightly pinched and released (poor skin turgor).  · Abdominal pain.  · Cuts or bruises that are slow to heal.  How is this diagnosed?  Hyperglycemia is diagnosed with a blood test to measure your blood glucose level. This blood test is usually done while you are having symptoms. Your health care provider may also do a physical exam and review your medical history.  You may have more tests to determine the cause of your hyperglycemia, such as:  · A fasting blood glucose (FBG) test. You will not be allowed to eat (you will fast) for at least 8 hours before a blood sample is taken.  · An A1c (hemoglobin A1c) blood test. This provides information about blood glucose control over the previous 2-3 months.  · An oral glucose tolerance test (OGTT). This measures your blood glucose at two times:  ? After fasting. This is your baseline blood glucose level.  ? Two hours after drinking a beverage that contains glucose.  How is this treated?  Treatment depends on the cause of your hyperglycemia. Treatment may include:  · Taking medicine to regulate your blood glucose levels. If you take insulin or other diabetes medicines, your medicine or dosage may be adjusted.  · Lifestyle changes, such as exercising more, eating healthier foods, or losing weight.  · Treating an illness or infection, if this caused your hyperglycemia.  · Checking your blood glucose more often.  · Stopping or reducing steroid medicines, if these caused your hyperglycemia.  If your hyperglycemia becomes severe and it results in hyperglycemic hyperosmolar state, you must be hospitalized and given IV fluids.  Follow these instructions at home:    General  instructions  · Take over-the-counter and prescription medicines only as told by your health care provider.  · Do not use any products that contain nicotine or tobacco, such as cigarettes and e-cigarettes. If you need help quitting, ask your health care provider.  · Limit alcohol intake to no more than 1 drink per day for nonpregnant women and 2 drinks per day for men. One drink equals 12 oz of beer, 5 oz of wine, or 1½ oz of hard liquor.  · Learn to manage stress. If you need help with this, ask your health care provider.  · Keep all follow-up visits as told by your health care provider. This is important.  Eating and drinking    · Maintain a healthy weight.  · Exercise regularly, as directed by your health care provider.  · Stay hydrated, especially when you exercise, get sick, or spend time in hot temperatures.  · Eat healthy foods, such as:  ? Lean proteins.  ? Complex carbohydrates.  ? Fresh fruits and vegetables.  ? Low-fat dairy products.  ? Healthy fats.  · Drink enough fluid to keep your urine clear or pale yellow.  If you have diabetes:  · Make sure you know the symptoms of hyperglycemia.  · Follow your diabetes management plan, as told by your health care provider. Make sure you:  ? Take your insulin and medicines as directed.  ? Follow your exercise plan.  ? Follow your meal plan. Eat on time, and do not skip meals.  ? Check your blood glucose as often as directed. Make sure to check your blood glucose before and after exercise. If you exercise longer or in a different way than usual, check your blood glucose more often.  ? Follow your sick day plan whenever you cannot eat or drink normally. Make this plan in advance with your health care provider.  · Share your diabetes management plan with people in your workplace, school, and household.  · Check your urine for ketones when you are ill and as told by your health care provider.  · Carry a medical alert card or wear medical alert jewelry.  Contact a  health care provider if:  · Your blood glucose is at or above 240 mg/dL (13.3 mmol/L) for 2 days in a row.  · You have problems keeping your blood glucose in your target range.  · You have frequent episodes of hyperglycemia.  Get help right away if:  · You have difficulty breathing.  · You have a change in how you think, feel, or act (mental status).  · You have nausea or vomiting that does not go away.  These symptoms may represent a serious problem that is an emergency. Do not wait to see if the symptoms will go away. Get medical help right away. Call your local emergency services (911 in the [place] Do not drive yourself to the hospital.  Summary  · Hyperglycemia occurs when the level of sugar (glucose) in the blood is too high.  · Hyperglycemia is diagnosed with a blood test to measure your blood glucose level. This blood test is usually done while you are having symptoms. Your health care provider may also do a physical exam and review your medical history.  · If you have diabetes, follow your diabetes management plan as told by your health care provider.  · Contact your health care provider if you have problems keeping your blood glucose in your target range.  This information is not intended to replace advice given to you by your health care provider. Make sure you discuss any questions you have with your health care provider.  Document Released: 06/13/2002 Document Revised: 09/04/2017 Document Reviewed: 09/04/2017  Elsevier Patient Education © 2020 [employer]    Diabetes Mellitus and Skin Care  Diabetes (diabetes mellitus) can lead to health problems over time, including skin problems. People with diabetes have a higher risk for many types of skin complications. This is because having poorly controlled blood sugar (glucose) levels can:  · Damage nerves and blood vessels. This can result in decreased feeling in your legs and feet, which means you may not notice minor skin injuries that could lead to serious  problems.  · Reduce blood flow (circulation), which makes wounds heal more slowly and increases your risk of infection.  · Cause areas of skin to become thick or discolored.  What are some common skin conditions that affect people with diabetes?  Diabetes often causes dry skin. It can also cause the skin on the feet to get thinner, break more easily, and heal more slowly. There are certain skin conditions that commonly affect people who have diabetes, such as:  · Bacterial skin infections, such as styes, boils, infected hair follicles, and infections of the skin around the nails.  · Fungal skin infections. These are most common in areas where skin rubs together, such as in the armpits or under the breasts.  · Open sores, especially on the feet.  · Tissue death (gangrene). This can happen on your feet if a serious infection does not heal properly. Gangrene can cause the need for a foot or leg to be surgically removed (amputated).  Diabetes can also cause the skin to change. You may develop:  · Dark, velvety markings on the skin that usually appear on the face, neck, armpits, inner thighs, and groin (acanthosis nigricans). This typically affects people of -American and American- descent.  · Red, raised, scar-like tissue that may itch, feel painful, or develop into a wound (necrobiosis lipoidica).  · Blisters on feet, toes, hands, or fingers.  · Thickened, wax-like areas of skin that usually occur on the hands, forehead, or toes (digital sclerosis).  · Brown or red ring-shaped or half-ring-shaped patches of skin on the ears or fingers (disseminated granuloma).  · Pea-shaped yellow bumps that may be itchy and surrounded by a red ring (eruptive xanthomatosis). This usually affects the arms, feet, buttocks, and the top of the hands.  · Round, discolored patches of tan skin that do not hurt or itch (diabetic dermopathy). These may look like age spots.  What do I need to know about itchy skin?  It is common for  people with diabetes to have itchy skin caused by dryness. Frequent high blood glucose levels can cause itchiness, and poor circulation and certain skin infections can make dry, itchy skin worse. If you have itchy skin that is red or covered in a rash, this could be a sign of an allergic reaction to a medicine.  If you have a rash or if your skin is very itchy, contact your health care provider. You may need help to manage your diabetes better, or you may need treatment for an infection.  How can I prevent skin breakdown?  When you have diabetes and you get a badly infected ulcer or sore that does not heal, your skin can break down, especially if you have poor circulation or are on bed rest. To prevent skin breakdown:  · Keep your skin clean and dry. Wash your skin often. Do not use hot water.  · Do not use any products that contain nicotine or tobacco, such as cigarettes and e-cigarettes. Smoking affects the body’s ability to heal. If you need help quitting, ask your health care provider.  · Check your skin every day for cuts, bruises, redness, blisters, or sores, especially on your feet. Tell your health care provider about any cuts, wounds, or sores you have, especially if they are healing slowly.  · If you are on bed rest, try to change positions often.  What else do I need to know about taking care of my skin?    · To relieve dry skin and itching:  ? Limit baths and showers to 5-10 minutes.  ? Bathe with lukewarm water instead of hot water.  ? Use mild soap and gentle skin cleansers. Do not use soap that is perfumed or harsh or dries your skin.  ? Put on lotion as soon as you finish bathing.  · Make sure that your health care provider performs a visual foot exam at every medical visit.  · Schedule a foot exam with your health care provider once every year. This exam includes an inspection of the structure and skin of your feet.  · If you get a skin injury, such as a cut, blister, or sore, check the area every  day for signs of infection. Check for:  ? More redness, swelling, or pain.  ? More fluid or blood.  ? Warmth.  ? Pus or a bad smell.  Contact a health care provider if:  · You develop a cut or sore, especially on your feet.  · You develop signs of infection after a skin injury.  · Your blood glucose level is higher than 240 mg/dL (13.3 mmol/L) for 2 days in a row.  · You have itchy skin that develops redness or a rash.  · You have discolored areas of skin.  · You have areas where your skin is changing, such as thickening or appearing shiny.  Summary  · Diabetes (diabetes mellitus) can lead to health problems over time, including skin problems.  · People with diabetes have a higher risk for many types of skin complications.  · Check your skin every day for cuts, bruises, redness, blisters, or sores, especially on your feet.  · Tell your health care provider about any cuts, wounds, or sores you have, especially if they are healing slowly.  This information is not intended to replace advice given to you by your health care provider. Make sure you discuss any questions you have with your health care provider.  Document Released: 05/30/2017 Document Revised: 07/12/2018 Document Reviewed: 05/30/2017  [employer] Patient Education © 2020 [employer]    Diabetes Mellitus and Sick Day Management  Blood sugar (glucose) can be difficult to control when you are sick. Common illnesses that can cause problems for people with diabetes (diabetes mellitus) include colds, fever, flu (influenza), nausea, vomiting, and diarrhea. These illnesses can cause stress and loss of body fluids (dehydration), and those issues can cause blood glucose levels to increase. Because of this, it is very important to take your insulin and diabetes medicines and eat some form of carbohydrate when you are sick.  You should make a plan for days when you are sick (sick day plan) as part of your diabetes management plan. You and your health care provider  should make this plan in advance. The following guidelines are intended to help you manage an illness that lasts for about 24 hours or less. Your health care provider may also give you more specific instructions.  What do I need to do to manage my blood glucose?    · Check your blood glucose every 2-4 hours, or as often as told by your health care provider.  · Know your sick day treatment goals. Your target blood glucose levels may be different when you are sick.  · If you use insulin, take your usual dose.  ? If your blood glucose continues to be too high, you may need to take an additional insulin dose as told by your health care provider.  · If you use oral diabetes medicine, you may need to stop taking it if you are not able to eat or drink normally. Ask your health care provider about whether you need to stop taking these medicines while you are sick.  · If you use injectable hormone medicines other than insulin to control your diabetes, ask your health care provider about whether you need to stop taking these medicines while you are sick.  What else can I do to manage my diabetes when I am sick?  Check your ketones  · If you have type 1 diabetes, check your urine ketones every 4 hours.  · If you have type 2 diabetes, check your urine ketones as often as told by your health care provider.  Drink fluids  · Drink enough fluid to keep your urine clear or pale yellow. This is especially important if you have a fever, vomiting, or diarrhea. Those symptoms can lead to dehydration.  · Follow any instructions from your health care provider about beverages to avoid.  ? Do not drink alcohol, caffeine, or drinks that contain a lot of sugar.  Take medicines as directed  · Take-over-the-counter and prescription medicines only as told by your health care provider.  · Check medicine labels for added sugars. Some medicines may contain sugar or types of sugars that can raise your blood glucose level.  What foods can I eat when  I am sick?    You need to eat some form of carbohydrates when you are sick. You should eat 45-50 grams (45-50 g) of carbohydrates every 3-4 hours until you feel better.  All of the food choices below contain about 15 g of carbohydrates. Plan ahead and keep some of these foods around so you have them if you get sick.  · 4-6 oz (120-177 mL) carbonated beverage that contains sugar, such as regular (not diet) soda. You may be able to drink carbonated beverages more easily if you open the beverage and let it sit at room temperature for a few minutes before drinking.  · ½ of a twin frozen ice pop.  · 4 oz (120 g) regular gelatin.  · 4 oz (120 mL) fruit juice.  · 4 oz (120 g) ice cream or frozen yogurt.  · 2 oz (60 g) sherbet.  · 8 oz (240 mL) clear broth or soup.  · 4 oz (120 g) regular custard.  · 4 oz (120 g) regular pudding.  · 8 oz (240 g) plain yogurt.  · 1 slice bread or toast.  · 6 saltine crackers.  · 5 vanilla wafers.  Questions to ask your health care provider  Consider asking the following questions so you know what to do on days when you are sick:  · Should I adjust my diabetes medicines?  · How often do I need to check my blood glucose?  · What supplies do I need to manage my diabetes at home when I am sick?  · What number can I call if I have questions?  · What foods and drinks should I avoid?  Contact a health care provider if:  · You develop symptoms of diabetic ketoacidosis, such as:  ? Fatigue.  ? Weight loss.  ? Excessive thirst.  ? Light-headedness.  ? Fruity or sweet-smelling breath.  ? Excessive urination.  ? Vision changes.  ? Confusion or irritability.  ? Nausea.  ? Vomiting.  ? Rapid breathing.  ? Pain in the abdomen.  ? Feeling flushed.  · You are unable to drink fluids without vomiting.  · You have any of the following for more than 6 hours:  ? Nausea.  ? Vomiting.  ? Diarrhea.  · Your blood glucose is at or above 240 mg/dL (13.3 mmol/L), even after you take an additional insulin dose.  · You  have a change in how you think, feel, or act (mental status).  · You develop another serious illness.  · You have been sick or have had a fever for 2 days or longer and you are not getting better.  Get help right away if:  · Your blood glucose is lower than 54 mg/dL (3.0 mmol/L).  · You have difficulty breathing.  · You have moderate or high ketone levels in your urine.  · You used emergency glucagon to treat low blood glucose.  Summary  · Blood sugar (glucose) can be difficult to control when you are sick. Common illnesses that can cause problems for people with diabetes (diabetes mellitus) include colds, fever, flu (influenza), nausea, vomiting, and diarrhea.  · Illnesses can cause stress and loss of body fluids (dehydration), and those issues can cause blood glucose levels to increase.  · Make a plan for days when you are sick (sick day plan) as part of your diabetes management plan. You and your health care provider should make this plan in advance.  · It is very important to take your insulin and diabetes medicines and to eat some form of carbohydrate when you are sick.  · Contact your health care provider if have problems managing your blood glucose levels when you are sick, or if you have been sick or had a fever for 2 days or longer and are not getting better.  This information is not intended to replace advice given to you by your health care provider. Make sure you discuss any questions you have with your health care provider.  Document Released: 12/20/2004 Document Revised: 09/15/2017 Document Reviewed: 09/15/2017  [employer] Patient Education © 2020 [employer]    Diabetes Mellitus and Foot Care  Foot care is an important part of your health, especially when you have diabetes. Diabetes may cause you to have problems because of poor blood flow (circulation) to your feet and legs, which can cause your skin to:  · Become thinner and drier.  · Break more easily.  · Heal more slowly.  · Peel and crack.  You  may also have nerve damage (neuropathy) in your legs and feet, causing decreased feeling in them. This means that you may not notice minor injuries to your feet that could lead to more serious problems. Noticing and addressing any potential problems early is the best way to prevent future foot problems.  How to care for your feet  Foot hygiene  · Wash your feet daily with warm water and mild soap. Do not use hot water. Then, pat your feet and the areas between your toes until they are completely dry. Do not soak your feet as this can dry your skin.  · Trim your toenails straight across. Do not dig under them or around the cuticle. File the edges of your nails with an emery board or nail file.  · Apply a moisturizing lotion or petroleum jelly to the skin on your feet and to dry, brittle toenails. Use lotion that does not contain alcohol and is unscented. Do not apply lotion between your toes.  Shoes and socks  · Wear clean socks or stockings every day. Make sure they are not too tight. Do not wear knee-high stockings since they may decrease blood flow to your legs.  · Wear shoes that fit properly and have enough cushioning. Always look in your shoes before you put them on to be sure there are no objects inside.  · To break in new shoes, wear them for just a few hours a day. This prevents injuries on your feet.  Wounds, scrapes, corns, and calluses  · Check your feet daily for blisters, cuts, bruises, sores, and redness. If you cannot see the bottom of your feet, use a mirror or ask someone for help.  · Do not cut corns or calluses or try to remove them with medicine.  · If you find a minor scrape, cut, or break in the skin on your feet, keep it and the skin around it clean and dry. You may clean these areas with mild soap and water. Do not clean the area with peroxide, alcohol, or iodine.  · If you have a wound, scrape, corn, or callus on your foot, look at it several times a day to make sure it is healing and not  infected. Check for:  ? Redness, swelling, or pain.  ? Fluid or blood.  ? Warmth.  ? Pus or a bad smell.  General instructions  · Do not cross your legs. This may decrease blood flow to your feet.  · Do not use heating pads or hot water bottles on your feet. They may burn your skin. If you have lost feeling in your feet or legs, you may not know this is happening until it is too late.  · Protect your feet from hot and cold by wearing shoes, such as at the beach or on hot pavement.  · Schedule a complete foot exam at least once a year (annually) or more often if you have foot problems. If you have foot problems, report any cuts, sores, or bruises to your health care provider immediately.  Contact a health care provider if:  · You have a medical condition that increases your risk of infection and you have any cuts, sores, or bruises on your feet.  · You have an injury that is not healing.  · You have redness on your legs or feet.  · You feel burning or tingling in your legs or feet.  · You have pain or cramps in your legs and feet.  · Your legs or feet are numb.  · Your feet always feel cold.  · You have pain around a toenail.  Get help right away if:  · You have a wound, scrape, corn, or callus on your foot and:  ? You have pain, swelling, or redness that gets worse.  ? You have fluid or blood coming from the wound, scrape, corn, or callus.  ? Your wound, scrape, corn, or callus feels warm to the touch.  ? You have pus or a bad smell coming from the wound, scrape, corn, or callus.  ? You have a fever.  ? You have a red line going up your leg.  Summary  · Check your feet every day for cuts, sores, red spots, swelling, and blisters.  · Moisturize feet and legs daily.  · Wear shoes that fit properly and have enough cushioning.  · If you have foot problems, report any cuts, sores, or bruises to your health care provider immediately.  · Schedule a complete foot exam at least once a year (annually) or more often if you  have foot problems.  This information is not intended to replace advice given to you by your health care provider. Make sure you discuss any questions you have with your health care provider.  Document Released: 12/15/2001 Document Revised: 09/09/2020 Document Reviewed: 01/19/2018  [employer] Patient Education © 2020 [employer]    Diabetes Mellitus and Nutrition, Adult  When you have diabetes (diabetes mellitus), it is very important to have healthy eating habits because your blood sugar (glucose) levels are greatly affected by what you eat and drink. Eating healthy foods in the appropriate amounts, at about the same times every day, can help you:  · Control your blood glucose.  · Lower your risk of heart disease.  · Improve your blood pressure.  · Reach or maintain a healthy weight.  Every person with diabetes is different, and each person has different needs for a meal plan. Your health care provider may recommend that you work with a diet and nutrition specialist (dietitian) to make a meal plan that is best for you. Your meal plan may vary depending on factors such as:  · The calories you need.  · The medicines you take.  · Your weight.  · Your blood glucose, blood pressure, and cholesterol levels.  · Your activity level.  · Other health conditions you have, such as heart or kidney disease.  How do carbohydrates affect me?  Carbohydrates, also called carbs, affect your blood glucose level more than any other type of food. Eating carbs naturally raises the amount of glucose in your blood. Carb counting is a method for keeping track of how many carbs you eat. Counting carbs is important to keep your blood glucose at a healthy level, especially if you use insulin or take certain oral diabetes medicines.  It is important to know how many carbs you can safely have in each meal. This is different for every person. Your dietitian can help you calculate how many carbs you should have at each meal and for each  "snack.  Foods that contain carbs include:  · Bread, cereal, rice, pasta, and crackers.  · Potatoes and corn.  · Peas, beans, and lentils.  · Milk and yogurt.  · Fruit and juice.  · Desserts, such as cakes, cookies, ice cream, and candy.  How does alcohol affect me?  Alcohol can cause a sudden decrease in blood glucose (hypoglycemia), especially if you use insulin or take certain oral diabetes medicines. Hypoglycemia can be a life-threatening condition. Symptoms of hypoglycemia (sleepiness, dizziness, and confusion) are similar to symptoms of having too much alcohol.  If your health care provider says that alcohol is safe for you, follow these guidelines:  · Limit alcohol intake to no more than 1 drink per day for nonpregnant women and 2 drinks per day for men. One drink equals 12 oz of beer, 5 oz of wine, or 1½ oz of hard liquor.  · Do not drink on an empty stomach.  · Keep yourself hydrated with water, diet soda, or unsweetened iced tea.  · Keep in mind that regular soda, juice, and other mixers may contain a lot of sugar and must be counted as carbs.  What are tips for following this plan?    Reading food labels  · Start by checking the serving size on the \"Nutrition Facts\" label of packaged foods and drinks. The amount of calories, carbs, fats, and other nutrients listed on the label is based on one serving of the item. Many items contain more than one serving per package.  · Check the total grams (g) of carbs in one serving. You can calculate the number of servings of carbs in one serving by dividing the total carbs by 15. For example, if a food has 30 g of total carbs, it would be equal to 2 servings of carbs.  · Check the number of grams (g) of saturated and trans fats in one serving. Choose foods that have low or no amount of these fats.  · Check the number of milligrams (mg) of salt (sodium) in one serving. Most people should limit total sodium intake to less than 2,300 mg per day.  · Always check the " "nutrition information of foods labeled as \"low-fat\" or \"nonfat\". These foods may be higher in added sugar or refined carbs and should be avoided.  · Talk to your dietitian to identify your daily goals for nutrients listed on the label.  Shopping  · Avoid buying canned, premade, or processed foods. These foods tend to be high in fat, sodium, and added sugar.  · Shop around the outside edge of the grocery store. This includes fresh fruits and vegetables, bulk grains, fresh meats, and fresh dairy.  Cooking  · Use low-heat cooking methods, such as baking, instead of high-heat cooking methods like deep frying.  · Cook using healthy oils, such as olive, canola, or sunflower oil.  · Avoid cooking with butter, cream, or high-fat meats.  Meal planning  · Eat meals and snacks regularly, preferably at the same times every day. Avoid going long periods of time without eating.  · Eat foods high in fiber, such as fresh fruits, vegetables, beans, and whole grains. Talk to your dietitian about how many servings of carbs you can eat at each meal.  · Eat 4-6 ounces (oz) of lean protein each day, such as lean meat, chicken, fish, eggs, or tofu. One oz of lean protein is equal to:  ? 1 oz of meat, chicken, or fish.  ? 1 egg.  ? ¼ cup of tofu.  · Eat some foods each day that contain healthy fats, such as avocado, nuts, seeds, and fish.  Lifestyle  · Check your blood glucose regularly.  · Exercise regularly as told by your health care provider. This may include:  ? 150 minutes of moderate-intensity or vigorous-intensity exercise each week. This could be brisk walking, biking, or water aerobics.  ? Stretching and doing strength exercises, such as yoga or weightlifting, at least 2 times a week.  · Take medicines as told by your health care provider.  · Do not use any products that contain nicotine or tobacco, such as cigarettes and e-cigarettes. If you need help quitting, ask your health care provider.  · Work with a counselor or diabetes " educator to identify strategies to manage stress and any emotional and social challenges.  Questions to ask a health care provider  · Do I need to meet with a diabetes educator?  · Do I need to meet with a dietitian?  · What number can I call if I have questions?  · When are the best times to check my blood glucose?  Where to find more information:  · American Diabetes Association: diabetes.org  · Academy of Nutrition and Dietetics: www.eatright.org  · National [place] of Diabetes and Digestive and Kidney Diseases (NIH): www.niddk.nih.gov  Summary  · A healthy meal plan will help you control your blood glucose and maintain a healthy lifestyle.  · Working with a diet and nutrition specialist (dietitian) can help you make a meal plan that is best for you.  · Keep in mind that carbohydrates (carbs) and alcohol have immediate effects on your blood glucose levels. It is important to count carbs and to use alcohol carefully.  This information is not intended to replace advice given to you by your health care provider. Make sure you discuss any questions you have with your health care provider.  Document Released: 09/14/2006 Document Revised: 11/30/2018 Document Reviewed: 01/22/2018  [employer] Patient Education © 2020 [employer]    Diabetes Mellitus and Exercise  Exercising regularly is important for your overall health, especially when you have diabetes (diabetes mellitus). Exercising is not only about losing weight. It has many other health benefits, such as increasing muscle strength and bone density and reducing body fat and stress. This leads to improved fitness, flexibility, and endurance, all of which result in better overall health.  Exercise has additional benefits for people with diabetes, including:  · Reducing appetite.  · Helping to lower and control blood glucose.  · Lowering blood pressure.  · Helping to control amounts of fatty substances (lipids) in the blood, such as cholesterol and  triglycerides.  · Helping the body to respond better to insulin (improving insulin sensitivity).  · Reducing how much insulin the body needs.  · Decreasing the risk for heart disease by:  ? Lowering cholesterol and triglyceride levels.  ? Increasing the levels of good cholesterol.  ? Lowering blood glucose levels.  What is my activity plan?  Your health care provider or certified diabetes educator can help you make a plan for the type and frequency of exercise (activity plan) that works for you. Make sure that you:  · Do at least 150 minutes of moderate-intensity or vigorous-intensity exercise each week. This could be brisk walking, biking, or water aerobics.  ? Do stretching and strength exercises, such as yoga or weightlifting, at least 2 times a week.  ? Spread out your activity over at least 3 days of the week.  · Get some form of physical activity every day.  ? Do not go more than 2 days in a row without some kind of physical activity.  ? Avoid being inactive for more than 30 minutes at a time. Take frequent breaks to walk or stretch.  · Choose a type of exercise or activity that you enjoy, and set realistic goals.  · Start slowly, and gradually increase the intensity of your exercise over time.  What do I need to know about managing my diabetes?    · Check your blood glucose before and after exercising.  ? If your blood glucose is 240 mg/dL (13.3 mmol/L) or higher before you exercise, check your urine for ketones. If you have ketones in your urine, do not exercise until your blood glucose returns to normal.  ? If your blood glucose is 100 mg/dL (5.6 mmol/L) or lower, eat a snack containing 15-20 grams of carbohydrate. Check your blood glucose 15 minutes after the snack to make sure that your level is above 100 mg/dL (5.6 mmol/L) before you start your exercise.  · Know the symptoms of low blood glucose (hypoglycemia) and how to treat it. Your risk for hypoglycemia increases during and after exercise. Common  symptoms of hypoglycemia can include:  ? Hunger.  ? Anxiety.  ? Sweating and feeling clammy.  ? Confusion.  ? Dizziness or feeling light-headed.  ? Increased heart rate or palpitations.  ? Blurry vision.  ? Tingling or numbness around the mouth, lips, or tongue.  ? Tremors or shakes.  ? Irritability.  · Keep a rapid-acting carbohydrate snack available before, during, and after exercise to help prevent or treat hypoglycemia.  · Avoid injecting insulin into areas of the body that are going to be exercised. For example, avoid injecting insulin into:  ? The arms, when playing tennis.  ? The legs, when jogging.  · Keep records of your exercise habits. Doing this can help you and your health care provider adjust your diabetes management plan as needed. Write down:  ? Food that you eat before and after you exercise.  ? Blood glucose levels before and after you exercise.  ? The type and amount of exercise you have done.  ? When your insulin is expected to peak, if you use insulin. Avoid exercising at times when your insulin is peaking.  · When you start a new exercise or activity, work with your health care provider to make sure the activity is safe for you, and to adjust your insulin, medicines, or food intake as needed.  · Drink plenty of water while you exercise to prevent dehydration or heat stroke. Drink enough fluid to keep your urine clear or pale yellow.  Summary  · Exercising regularly is important for your overall health, especially when you have diabetes (diabetes mellitus).  · Exercising has many health benefits, such as increasing muscle strength and bone density and reducing body fat and stress.  · Your health care provider or certified diabetes educator can help you make a plan for the type and frequency of exercise (activity plan) that works for you.  · When you start a new exercise or activity, work with your health care provider to make sure the activity is safe for you, and to adjust your insulin,  medicines, or food intake as needed.  This information is not intended to replace advice given to you by your health care provider. Make sure you discuss any questions you have with your health care provider.  Document Released: 03/09/2005 Document Revised: 07/12/2018 Document Reviewed: 05/29/2017  [employer] Patient Education © 2020 [employer]    Blood Glucose Monitoring, Adult  Monitoring your blood sugar (glucose) is an important part of managing your diabetes (diabetes mellitus). Blood glucose monitoring involves checking your blood glucose as often as directed and keeping a record (log) of your results over time.  Checking your blood glucose regularly and keeping a blood glucose log can:  · Help you and your health care provider adjust your diabetes management plan as needed, including your medicines or insulin.  · Help you understand how food, exercise, illnesses, and medicines affect your blood glucose.  · Let you know what your blood glucose is at any time. You can quickly find out if you have low blood glucose (hypoglycemia) or high blood glucose (hyperglycemia).  Your health care provider will set individualized treatment goals for you. Your goals will be based on your age, other medical conditions you have, and how you respond to diabetes treatment. Generally, the goal of treatment is to maintain the following blood glucose levels:  · Before meals (preprandial):  mg/dL (4.4-7.2 mmol/L).  · After meals (postprandial): below 180 mg/dL (10 mmol/L).  · A1c level: less than 7%.  Supplies needed:  · Blood glucose meter.  · Test strips for your meter. Each meter has its own strips. You must use the strips that came with your meter.  · A needle to prick your finger (lancet). Do not use a lancet more than one time.  · A device that holds the lancet (lancing device).  · A journal or log book to write down your results.  How to check your blood glucose    1. Wash your hands with soap and water.  2. Prick the  side of your finger (not the tip) with the lancet. Use a different finger each time.  3. Gently rub the finger until a small drop of blood appears.  4. Follow instructions that come with your meter for inserting the test strip, applying blood to the strip, and using your blood glucose meter.  5. Write down your result and any notes.  Some meters allow you to use areas of your body other than your finger (alternative sites) to test your blood. The most common alternative sites are:  · Forearm.  · Thigh.  · Palm of the hand.  If you think you may have hypoglycemia, or if you have a history of not knowing when your blood glucose is getting low (hypoglycemia unawareness), do not use alternative sites. Use your finger instead. Alternative sites may not be as accurate as the fingers, because blood flow is slower in these areas. This means that the result you get may be delayed, and it may be different from the result that you would get from your finger.  Follow these instructions at home:  Blood glucose log    · Every time you check your blood glucose, write down your result. Also write down any notes about things that may be affecting your blood glucose, such as your diet and exercise for the day. This information can help you and your health care provider:  ? Look for patterns in your blood glucose over time.  ? Adjust your diabetes management plan as needed.  · Check if your meter allows you to download your records to a computer. Most glucose meters store a record of glucose readings in the meter.  If you have type 1 diabetes:  · Check your blood glucose 2 or more times a day.  · Also check your blood glucose:  ? Before every insulin injection.  ? Before and after exercise.  ? Before meals.  ? 2 hours after a meal.  ? Occasionally between 2:00 a.m. and 3:00 a.m., as directed.  ? Before potentially dangerous tasks, like driving or using heavy machinery.  ? At bedtime.  · You may need to check your blood glucose more  "often, up to 6-10 times a day, if you:  ? Use an insulin pump.  ? Need multiple daily injections (MDI).  ? Have diabetes that is not well-controlled.  ? Are ill.  ? Have a history of severe hypoglycemia.  ? Have hypoglycemia unawareness.  If you have type 2 diabetes:  · If you take insulin or other diabetes medicines, check your blood glucose 2 or more times a day.  · If you are on intensive insulin therapy, check your blood glucose 4 or more times a day. Occasionally, you may also need to check between 2:00 a.m. and 3:00 a.m., as directed.  · Also check your blood glucose:  ? Before and after exercise.  ? Before potentially dangerous tasks, like driving or using heavy machinery.  · You may need to check your blood glucose more often if:  ? Your medicine is being adjusted.  ? Your diabetes is not well-controlled.  ? You are ill.  General tips  · Always keep your supplies with you.  · If you have questions or need help, all blood glucose meters have a 24-hour \"hotline\" phone number that you can call. You may also contact your health care provider.  · After you use a few boxes of test strips, adjust (calibrate) your blood glucose meter by following instructions that came with your meter.  Contact a health care provider if:  · Your blood glucose is at or above 240 mg/dL (13.3 mmol/L) for 2 days in a row.  · You have been sick or have had a fever for 2 days or longer, and you are not getting better.  · You have any of the following problems for more than 6 hours:  ? You cannot eat or drink.  ? You have nausea or vomiting.  ? You have diarrhea.  Get help right away if:  · Your blood glucose is lower than 54 mg/dL (3 mmol/L).  · You become confused or you have trouble thinking clearly.  · You have difficulty breathing.  · You have moderate or large ketone levels in your urine.  Summary  · Monitoring your blood sugar (glucose) is an important part of managing your diabetes (diabetes mellitus).  · Blood glucose monitoring " involves checking your blood glucose as often as directed and keeping a record (log) of your results over time.  · Your health care provider will set individualized treatment goals for you. Your goals will be based on your age, other medical conditions you have, and how you respond to diabetes treatment.  · Every time you check your blood glucose, write down your result. Also write down any notes about things that may be affecting your blood glucose, such as your diet and exercise for the day.  This information is not intended to replace advice given to you by your health care provider. Make sure you discuss any questions you have with your health care provider.  Document Released: 12/20/2004 Document Revised: 10/11/2019 Document Reviewed: 05/29/2017  [employer] Patient Education © 2020 [employer]  Sitagliptin oral tablet  What is this medicine?  SITAGLIPTIN (sit a GLIP tin) helps to treat type 2 diabetes. It helps to control blood sugar. Treatment is combined with diet and exercise.  This medicine may be used for other purposes; ask your health care provider or pharmacist if you have questions.  COMMON BRAND NAME(S): Januvia  What should I tell my health care provider before I take this medicine?  They need to know if you have any of these conditions:  · diabetic ketoacidosis  · kidney disease  · pancreatitis  · previous swelling of the tongue, face, or lips with difficulty breathing, difficulty swallowing, hoarseness, or tightening of the throat  · type 1 diabetes  · an unusual or allergic reaction to sitagliptin, other medicines, foods, dyes, or preservatives  · pregnant or trying to get pregnant  · breast-feeding  How should I use this medicine?  Take this medicine by mouth with a glass of water. Follow the directions on the prescription label. You can take it with or without food. Do not cut, crush or chew this medicine. Take your dose at the same time each day. Do not take more often than directed. Do not  stop taking except on your doctor's advice.  A special MedGuide will be given to you by the pharmacist with each prescription and refill. Be sure to read this information carefully each time.  Talk to your pediatrician regarding the use of this medicine in children. Special care may be needed.  Overdosage: If you think you have taken too much of this medicine contact a poison control center or emergency room at once.  NOTE: This medicine is only for you. Do not share this medicine with others.  What if I miss a dose?  If you miss a dose, take it as soon as you can. If it is almost time for your next dose, take only that dose. Do not take double or extra doses.  What may interact with this medicine?  Do not take this medicine with any of the following medications:  · gatifloxacin  This medicine may also interact with the following medications:  · alcohol  · digoxin  · insulin  · sulfonylureas like glimepiride, glipizide, glyburide  This list may not describe all possible interactions. Give your health care provider a list of all the medicines, herbs, non-prescription drugs, or dietary supplements you use. Also tell them if you smoke, drink alcohol, or use illegal drugs. Some items may interact with your medicine.  What should I watch for while using this medicine?  Visit your doctor or health care professional for regular checks on your progress.  A test called the HbA1C (A1C) will be monitored. This is a simple blood test. It measures your blood sugar control over the last 2 to 3 months. You will receive this test every 3 to 6 months.  Learn how to check your blood sugar. Learn the symptoms of low and high blood sugar and how to manage them.  Always carry a quick-source of sugar with you in case you have symptoms of low blood sugar. Examples include hard sugar candy or glucose tablets. Make sure others know that you can choke if you eat or drink when you develop serious symptoms of low blood sugar, such as seizures  or unconsciousness. They must get medical help at once.  Tell your doctor or health care professional if you have high blood sugar. You might need to change the dose of your medicine. If you are sick or exercising more than usual, you might need to change the dose of your medicine.  Do not skip meals. Ask your doctor or health care professional if you should avoid alcohol. Many nonprescription cough and cold products contain sugar or alcohol. These can affect blood sugar.  Wear a medical ID bracelet or chain, and carry a card that describes your disease and details of your medicine and dosage times.  What side effects may I notice from receiving this medicine?  Side effects that you should report to your doctor or health care professional as soon as possible:  · allergic reactions like skin rash, itching or hives, swelling of the face, lips, or tongue  · breathing problems  · general ill feeling or flu-like symptoms  · joint pain  · loss of appetite  · redness, blistering, peeling or loosening of the skin, including inside the mouth  · signs and symptoms of heart failure like breathing problems, fast, irregular heartbeat, sudden weight gain; swelling of the ankles, feet, hands; unusually weak or tired  · signs and symptoms of low blood sugar such as feeling anxious, confusion, dizziness, increased hunger, unusually weak or tired, sweating, shakiness, cold, irritable, headache, blurred vision, fast heartbeat, loss of consciousness  · signs and symptoms of muscle injury like dark urine; trouble passing urine or change in the amount of urine; unusually weak or tired; muscle pain; back pain  · unusual stomach upset or pain  · vomiting  Side effects that usually do not require medical attention (report to your doctor or health care professional if they continue or are bothersome):  · diarrhea  · headache  · sore throat  · stomach upset  · stuffy or runny nose  This list may not describe all possible side effects. Call  your doctor for medical advice about side effects. You may report side effects to FDA at 3-517-FRL-3156.  Where should I keep my medicine?  Keep out of the reach of children.  Store at room temperature between 15 and 30 degrees C (59 and 86 degrees F). Throw away any unused medicine after the expiration date.  NOTE: This sheet is a summary. It may not cover all possible information. If you have questions about this medicine, talk to your doctor, pharmacist, or health care provider.  © 2020 [employer] (2019-07-23 16:38:43)  Empagliflozin oral tablets  What is this medicine?  EMPAGLIFLOZIN (EM pa gli FLOE zin) helps to treat type 2 diabetes. It helps to control blood sugar. This drug may also reduce the risk of heart attack or stroke if you have type 2 diabetes and risk factors for heart disease. Treatment is combined with diet and exercise.  This medicine may be used for other purposes; ask your health care provider or pharmacist if you have questions.  COMMON BRAND NAME(S): JARDIANCE  What should I tell my health care provider before I take this medicine?  They need to know if you have any of these conditions:  · dehydration  · diabetic ketoacidosis  · diet low in salt  · eating less due to illness, surgery, dieting, or any other reason  · having surgery  · high cholesterol  · high levels of potassium in the blood  · history of pancreatitis or pancreas problems  · history of yeast infection of the penis or vagina  · if you often drink alcohol  · infections in the bladder, kidneys, or urinary tract  · kidney disease  · liver disease  · low blood pressure  · on hemodialysis  · problems urinating  · type 1 diabetes  · uncircumcised male  · an unusual or allergic reaction to empagliflozin, other medicines, foods, dyes, or preservatives  · pregnant or trying to get pregnant  · breast-feeding  How should I use this medicine?  Take this medicine by mouth with a glass of water. Follow the directions on the  prescription label. Take it in the morning, with or without food. Take your dose at the same time each day. Do not take more often than directed. Do not stop taking except on your doctor's advice.  Talk to your pediatrician regarding the use of this medicine in children. Special care may be needed.  Overdosage: If you think you have taken too much of this medicine contact a poison control center or emergency room at once.  NOTE: This medicine is only for you. Do not share this medicine with others.  What if I miss a dose?  If you miss a dose, take it as soon as you can. If it is almost time for your next dose, take only that dose. Do not take double or extra doses.  What may interact with this medicine?  Do not take this medicine with any of the following medications:  · gatifloxacin  This medicine may also interact with the following medications:  · alcohol  · certain medicines for blood pressure, heart disease  · diuretics  This list may not describe all possible interactions. Give your health care provider a list of all the medicines, herbs, non-prescription drugs, or dietary supplements you use. Also tell them if you smoke, drink alcohol, or use illegal drugs. Some items may interact with your medicine.  What should I watch for while using this medicine?  Visit your doctor or health care professional for regular checks on your progress.  This medicine can cause a serious condition in which there is too much acid in the blood. If you develop nausea, vomiting, stomach pain, unusual tiredness, or breathing problems, stop taking this medicine and call your doctor right away. If possible, use a ketone dipstick to check for ketones in your urine.  A test called the HbA1C (A1C) will be monitored. This is a simple blood test. It measures your blood sugar control over the last 2 to 3 months. You will receive this test every 3 to 6 months.  Learn how to check your blood sugar. Learn the symptoms of low and high blood  sugar and how to manage them.  Always carry a quick-source of sugar with you in case you have symptoms of low blood sugar. Examples include hard sugar candy or glucose tablets. Make sure others know that you can choke if you eat or drink when you develop serious symptoms of low blood sugar, such as seizures or unconsciousness. They must get medical help at once.  Tell your doctor or health care professional if you have high blood sugar. You might need to change the dose of your medicine. If you are sick or exercising more than usual, you might need to change the dose of your medicine.  Do not skip meals. Ask your doctor or health care professional if you should avoid alcohol. Many nonprescription cough and cold products contain sugar or alcohol. These can affect blood sugar.  Wear a medical ID bracelet or chain, and carry a card that describes your disease and details of your medicine and dosage times.  What side effects may I notice from receiving this medicine?  Side effects that you should report to your doctor or health care professional as soon as possible:  · allergic reactions like skin rash, itching or hives, swelling of the face, lips, or tongue  · breathing problems  · dizziness  · feeling faint or lightheaded, falls  · muscle weakness  · nausea, vomiting, unusual stomach upset or pain  · penile discharge, itching, or pain in men  · signs and symptoms of a genital infection, such as fever; tenderness, redness, or swelling in the genitals or area from the genitals to the back of the rectum  · signs and symptoms of low blood sugar such as feeling anxious, confusion, dizziness, increased hunger, unusually weak or tired, sweating, shakiness, cold, irritable, headache, blurred vision, fast heartbeat, loss of consciousness  · signs and symptoms of a urinary tract infection, such as fever, chills, a burning feeling when urinating, blood in the urine, back pain  · trouble passing urine or change in the amount of  "urine, including an urgent need to urinate more often, in larger amounts, or at night  · unusual tiredness  · vaginal discharge, itching, or odor in women  Side effects that usually do not require medical attention (report to your doctor or health care professional if they continue or are bothersome):  · mild increase in urination  · thirsty  This list may not describe all possible side effects. Call your doctor for medical advice about side effects. You may report side effects to FDA at 6-054-EWC-3405.  Where should I keep my medicine?  Keep out of the reach of children.  Store at room temperature between 20 and 25 degrees C (68 and 77 degrees F). Throw away any unused medicine after the expiration date.  NOTE: This sheet is a summary. It may not cover all possible information. If you have questions about this medicine, talk to your doctor, pharmacist, or health care provider.  © 2020 [employer] (2018-08-30 10:25:34)  redTd (Tetanus, Diphtheria) Vaccine: What You Need to Know  1. Why get vaccinated?  Td vaccine can prevent tetanus and diphtheria.  Tetanus enters the body through cuts or wounds. Diphtheria spreads from person to person.  · TETANUS (T) causes painful stiffening of the muscles. Tetanus can lead to serious health problems, including being unable to open the mouth, having trouble swallowing and breathing, or death.  · DIPHTHERIA (D) can lead to difficulty breathing, heart failure, paralysis, or death.  2. Td vaccine  Td is only for children 7 years and older, adolescents, and adults.   Td is usually given as a booster dose every 10 years, but it can also be given earlier after a severe and dirty wound or burn.  Another vaccine, called Tdap, that protects against pertussis, also known as \"whooping cough,\" in addition to tetanus and diphtheria, may be used instead of Td.   Td may be given at the same time as other vaccines.  3. Talk with your health care provider  Tell your vaccine provider if " the person getting the vaccine:  · Has had an allergic reaction after a previous dose of any vaccine that protects against tetanus or diphtheria, or has any severe, life-threatening allergies.  · Has ever had Guillain-Barré Syndrome (also called GBS).  · Has had severe pain or swelling after a previous dose of any vaccine that protects against tetanus or diphtheria.  In some cases, your health care provider may decide to postpone Td vaccination to a future visit.   People with minor illnesses, such as a cold, may be vaccinated. People who are moderately or severely ill should usually wait until they recover before getting Td vaccine.   Your health care provider can give you more information.  4. Risks of a vaccine reaction  · Pain, redness, or swelling where the shot was given, mild fever, headache, feeling tired, and nausea, vomiting, diarrhea, or stomachache sometimes happen after Td vaccine.  People sometimes faint after medical procedures, including vaccination. Tell your provider if you feel dizzy or have vision changes or ringing in the ears.   As with any medicine, there is a very remote chance of a vaccine causing a severe allergic reaction, other serious injury, or death.  5. What if there is a serious problem?  An allergic reaction could occur after the vaccinated person leaves the clinic. If you see signs of a severe allergic reaction (hives, swelling of the face and throat, difficulty breathing, a fast heartbeat, dizziness, or weakness), call [id number] and get the person to the nearest hospital.   For other signs that concern you, call your health care provider.   Adverse reactions should be reported to the Vaccine Adverse Event Reporting System (VAERS). Your health care provider will usually file this report, or you can do it yourself. Visit the VAERS website at www.vaers.hhs.gov or call 1-221.261.5813. VAERS is only for reporting reactions, and VAERS staff do not give medical advice.  6. The National  Vaccine Injury Compensation Program  The National Vaccine Injury Compensation Program (VICP) is a federal program that was created to compensate people who may have been injured by certain vaccines. Visit the VICP website at www.hrsa.gov/vaccinecompensation or call 1-678.809.7347 to learn about the program and about filing a claim. There is a time limit to file a claim for compensation.  7. How can I learn more?  · Ask your health care provider.  · Call your local or state health department.  · Contact the Centers for Disease Control and Prevention (CDC):  ? Call 1-923.837.5293 (0-117-ITB-INFO) or  ? Visit CDC's website at www.cdc.gov/vaccines  Vaccine Information Statement Td Vaccine (04/01/20)  This information is not intended to replace advice given to you by your health care provider. Make sure you discuss any questions you have with your health care provider.  Document Released: 10/15/2007 Document Revised: 05/11/2020 Document Reviewed: 04/13/2020  [employer] Patient Education © 2020 [employer]  Pneumococcal Conjugate Vaccine (PCV13): What You Need to Know  1. Why get vaccinated?  Pneumococcal conjugate vaccine (PCV13) can prevent pneumococcal disease.  Pneumococcal disease refers to any illness caused by pneumococcal bacteria. These bacteria can cause many types of illnesses, including pneumonia, which is an infection of the lungs. Pneumococcal bacteria are one of the most common causes of pneumonia.  Besides pneumonia, pneumococcal bacteria can also cause:  · Ear infections  · Sinus infections  · Meningitis (infection of the tissue covering the brain and spinal cord)  · Bacteremia (bloodstream infection)  Anyone can get pneumococcal disease, but children under 2 years of age, people with certain medical conditions, adults 65 years or older, and cigarette smokers are at the highest risk.  Most pneumococcal infections are mild. However, some can result in long-term problems, such as brain damage or hearing  loss. Meningitis, bacteremia, and pneumonia caused by pneumococcal disease can be fatal.  2. PCV13  PCV13 protects against 13 types of bacteria that cause pneumococcal disease.  Infants and young children usually need 4 doses of pneumococcal conjugate vaccine, at 2, 4, 6, and 12-15 months of age. In some cases, a child might need fewer than 4 doses to complete PCV13 vaccination.  A dose of PCV23 vaccine is also recommended for anyone 2 years or older with certain medical conditions if they did not already receive PCV13.  This vaccine may be given to adults 65 years or older based on discussions between the patient and health care provider.  3. Talk with your health care provider  Tell your vaccine provider if the person getting the vaccine:  · Has had an allergic reaction after a previous dose of PCV13, to an earlier pneumococcal conjugate vaccine known as PCV7, or to any vaccine containing diphtheria toxoid (for example, DTaP), or has any severe, life-threatening allergies.  · In some cases, your health care provider may decide to postpone PCV13 vaccination to a future visit.  People with minor illnesses, such as a cold, may be vaccinated. People who are moderately or severely ill should usually wait until they recover before getting PCV13.  Your health care provider can give you more information.  4. Risks of a vaccine reaction  · Redness, swelling, pain, or tenderness where the shot is given, and fever, loss of appetite, fussiness (irritability), feeling tired, headache, and chills can happen after PCV13.  Young children may be at increased risk for seizures caused by fever after PCV13 if it is administered at the same time as inactivated influenza vaccine. Ask your health care provider for more information.  People sometimes faint after medical procedures, including vaccination. Tell your provider if you feel dizzy or have vision changes or ringing in the ears.  As with any medicine, there is a very remote  chance of a vaccine causing a severe allergic reaction, other serious injury, or death.  5. What if there is a serious problem?  An allergic reaction could occur after the vaccinated person leaves the clinic. If you see signs of a severe allergic reaction (hives, swelling of the face and throat, difficulty breathing, a fast heartbeat, dizziness, or weakness), call 9-1-1 and get the person to the nearest hospital.  For other signs that concern you, call your health care provider.  Adverse reactions should be reported to the Vaccine Adverse Event Reporting System (VAERS). Your health care provider will usually file this report, or you can do it yourself. Visit the VAERS website at www.vaers.hhs.gov or call 1-442.949.4906. VAERS is only for reporting reactions, and VAERS staff do not give medical advice.  6. The National Vaccine Injury Compensation Program  The National Vaccine Injury Compensation Program (VICP) is a federal program that was created to compensate people who may have been injured by certain vaccines. Visit the VICP website at www.hrsa.gov/vaccinecompensation or call 1-191.143.4495 to learn about the program and about filing a claim. There is a time limit to file a claim for compensation.  7. How can I learn more?  · Ask your health care provider.  · Call your local or state health department.  · Contact the Centers for Disease Control and Prevention (CDC):  ? Call 1-594.277.8897 (0-186-TWE-INFO) or  ? Visit CDC's website at www.cdc.gov/vaccines  Vaccine Information Statement PCV13 Vaccine (10/30/2019)  This information is not intended to replace advice given to you by your health care provider. Make sure you discuss any questions you have with your health care provider.  Document Released: 10/15/2007 Document Revised: 04/07/2020 Document Reviewed: 07/30/2019  [employer] Patient Education © 2020 [employer]

## 2020-12-07 ENCOUNTER — TELEPHONE (OUTPATIENT)
Dept: FAMILY MEDICINE CLINIC | Facility: CLINIC | Age: 50
End: 2020-12-07

## 2020-12-07 NOTE — TELEPHONE ENCOUNTER
HUB CAN READ. ONLY CAN TALK TO PT AS HE DID NOT LIST ANYONE ON HIS VERBAL RELEASE. Left a message to call office.

## 2020-12-07 NOTE — TELEPHONE ENCOUNTER
PATIENTS WIFE CALLED STATING THE PATIENT TESTED POSITIVE FOR COVID 12/01/2020, THE PATIENT IS STILL EXPERIENCING COUGHING, CHILLS, LOSS OF APATATE, AND STILL DOES NOT HAVE ANY TASTE AND CAN NOT SMELL ANYTHING, AND VERY FATIGUE. PATIENT WOULD LIKE A CALL BACK TO SEE WHAT STEPS NEED TO BE TAKEN NEXT.     GOOD CALL BACK NUMBER   [phone]

## 2020-12-10 ENCOUNTER — TELEPHONE (OUTPATIENT)
Dept: FAMILY MEDICINE CLINIC | Facility: CLINIC | Age: 50
End: 2020-12-10

## 2020-12-10 NOTE — TELEPHONE ENCOUNTER
PT'S WIFE CALLED AGAIN ASKING FOR ADVICE ON COVID TREATMENT. REMINDED PT'S WIFE THAT WE CANNOT DISCUSS MEDICAL INFORMATION WITH ANYONE BUT PT SINCE SHE IS NOT ON VERBAL RELEASE. ADVISED TO HAVE PATIENT CALL TO MAKE TELEPHONE APPT.

## 2020-12-10 NOTE — TELEPHONE ENCOUNTER
Please call wife back at [phone]. Regarding Manohar, he is positive for COVID and is having trouble breathing. And would like to know what to do. Or if something could be called in

## 2020-12-11 ENCOUNTER — OFFICE VISIT (OUTPATIENT)
Dept: FAMILY MEDICINE CLINIC | Facility: CLINIC | Age: 50
End: 2020-12-11

## 2020-12-11 DIAGNOSIS — U07.1 COVID-19 VIRUS INFECTION: Primary | ICD-10-CM

## 2020-12-11 PROCEDURE — 99442 PR PHYS/QHP TELEPHONE EVALUATION 11-20 MIN: CPT | Performed by: NURSE PRACTITIONER

## 2020-12-11 RX ORDER — AZITHROMYCIN 250 MG/1
TABLET, FILM COATED ORAL
Qty: 6 TABLET | Refills: 0 | Status: SHIPPED | OUTPATIENT
Start: 2020-12-11

## 2020-12-11 RX ORDER — PREDNISONE 10 MG/1
TABLET ORAL
Qty: 10 TABLET | Refills: 0 | Status: SHIPPED | OUTPATIENT
Start: 2020-12-11

## 2020-12-11 NOTE — PROGRESS NOTES
"Subjective   Manohar Marie is a 50 y.o. male.     You have chosen to receive care through a telephone visit. Do you consent to use a telephone visit for your medical care today? Yes    PCP: Dr. Solis    CC: \"covid\"    Tested + for Covid on 12-1-2020.  Still has cough and dyspnea.  Seen at ER on 12-7-2020.  Had negative CXR, labs, EKG per patient report.  Was given inhaler, but hasn't used much because didn't seem to help.  NO fever, cough is dry.  Used steam and homeopathic treatments without much improvement.  Has had problems with bronchitis in the past.      Cough  This is a new problem. Episode onset: around 12-1. The problem has been unchanged. The problem occurs every few minutes. The cough is non-productive. Associated symptoms include shortness of breath. Pertinent negatives include no chest pain, chills, ear congestion, ear pain, fever, headaches, heartburn, hemoptysis, myalgias, nasal congestion, postnasal drip, rash, rhinorrhea, sore throat, sweats or wheezing. Weight loss:  unsure, hasn't been eating. Nothing aggravates the symptoms. He has tried a beta-agonist inhaler for the symptoms. The treatment provided no relief. His past medical history is significant for bronchitis. There is no history of asthma or COPD.        The following portions of the patient's history were reviewed and updated as appropriate: allergies, current medications, past medical history, past social history, past surgical history and problem list.    Review of Systems   Constitutional: Positive for appetite change and fatigue. Negative for chills and fever. Weight loss:  unsure, hasn't been eating.   HENT: Negative for congestion, ear discharge, ear pain, postnasal drip, rhinorrhea, sinus pressure, sneezing, sore throat and swollen glands.         +loss of taste/smell     Eyes: Negative.    Respiratory: Positive for cough and shortness of breath. Negative for hemoptysis, chest tightness and wheezing.    Cardiovascular: " Negative for chest pain.   Gastrointestinal: Positive for nausea. Negative for diarrhea and vomiting.   Genitourinary: Negative for difficulty urinating.   Musculoskeletal: Negative for myalgias.   Skin: Negative for rash.   Neurological: Negative for dizziness and headache.     There were no vitals taken for this visit.    Objective   Physical Exam  Pulmonary:      Comments: Frequent, dry, tight cough noted during telephone visit    Neurological:      Mental Status: He is oriented to person, place, and time.   Psychiatric:         Mood and Affect: Mood normal.         Thought Content: Thought content normal.         Judgment: Judgment normal.       No results found for this or any previous visit (from the past 24 hour(s)).  Xr Spine Lumbar 4+ View (in Office)    Result Date: 10/26/2020  Normal lumbosacral spine. Electronically signed by:  Yaakov Tee MD  10/26/2020 4:42 PM CDT Workstation: [id number]        Assessment/Plan   Diagnoses and all orders for this visit:    1. COVID-19 virus infection (Primary)  -     azithromycin (Zithromax Z-Rio) 250 MG tablet; Take 2 tablets by mouth on day 1, then 1 tablet daily on days 2-5  Dispense: 6 tablet; Refill: 0  -     predniSONE (DELTASONE) 10 MG tablet; 2 tabs po daily x 3 days, then 1 tab po daily x 4 days  Dispense: 10 tablet; Refill: 0      Push fluids  Rest  Tylenol as needed  Rx for Zithromax, Prednisone  Monitor blood sugars closely while on steroids  May take Glucerna supplements until appetite improves  Denies nausea meds  Encouraged to use Albuterol inhaler more regularly over the next few days to see if there is an improvement in symptoms    Has repeat Covid testing scheduled for 12-.    RTC for in person exam if symptoms persist.      See PCP or RTC if symptoms persist/worsen  See PCP for routine f/u visit and management of chronic medical conditions      This document has been electronically signed by SHUKRI Nevarez on December 11, 2020 17:46  CST,.    This visit has been rescheduled as a phone visit to comply with patient safety concerns in accordance with CDC recommendations. Total time of discussion was 15 minutes.

## 2020-12-11 NOTE — PATIENT INSTRUCTIONS
COVID-19  COVID-19 is a respiratory infection that is caused by a virus called severe acute respiratory syndrome coronavirus 2 (SARS-CoV-2). The disease is also known as coronavirus disease or novel coronavirus. In some people, the virus may not cause any symptoms. In others, it may cause a serious infection. The infection can get worse quickly and can lead to complications, such as:  · Pneumonia, or infection of the lungs.  · Acute respiratory distress syndrome or ARDS. This is a condition in which fluid build-up in the lungs prevents the lungs from filling with air and passing oxygen into the blood.  · Acute respiratory failure. This is a condition in which there is not enough oxygen passing from the lungs to the body or when carbon dioxide is not passing from the lungs out of the body.  · Sepsis or septic shock. This is a serious bodily reaction to an infection.  · Blood clotting problems.  · Secondary infections due to bacteria or fungus.  · Organ failure. This is when your body's organs stop working.  The virus that causes COVID-19 is contagious. This means that it can spread from person to person through droplets from coughs and sneezes (respiratory secretions).  What are the causes?  This illness is caused by a virus. You may catch the virus by:  · Breathing in droplets from an infected person. Droplets can be spread by a person breathing, speaking, singing, coughing, or sneezing.  · Touching something, like a table or a doorknob, that was exposed to the virus (contaminated) and then touching your mouth, nose, or eyes.  What increases the risk?  Risk for infection  You are more likely to be infected with this virus if you:  · Are within 6 feet (2 meters) of a person with COVID-19.  · Provide care for or live with a person who is infected with COVID-19.  · Spend time in crowded indoor spaces or live in shared housing.  Risk for serious illness  You are more likely to become seriously ill from the virus if  "you:  · Are 50 years of age or older. The higher your age, the more you are at risk for serious illness.  · Live in a nursing home or long-term care facility.  · Have cancer.  · Have a long-term (chronic) disease such as:  ? Chronic lung disease, including chronic obstructive pulmonary disease or asthma.  ? A long-term disease that lowers your body's ability to fight infection (immunocompromised).  ? Heart disease, including heart failure, a condition in which the arteries that lead to the heart become narrow or blocked (coronary artery disease), a disease which makes the heart muscle thick, weak, or stiff (cardiomyopathy).  ? Diabetes.  ? Chronic kidney disease.  ? Sickle cell disease, a condition in which red blood cells have an abnormal \"sickle\" shape.  ? Liver disease.  · Are obese.  What are the signs or symptoms?  Symptoms of this condition can range from mild to severe. Symptoms may appear any time from 2 to 14 days after being exposed to the virus. They include:  · A fever or chills.  · A cough.  · Difficulty breathing.  · Headaches, body aches, or muscle aches.  · Runny or stuffy (congested) nose.  · A sore throat.  · New loss of taste or smell.  Some people may also have stomach problems, such as nausea, vomiting, or diarrhea.  Other people may not have any symptoms of COVID-19.  How is this diagnosed?  This condition may be diagnosed based on:  · Your signs and symptoms, especially if:  ? You live in an area with a COVID-19 outbreak.  ? You recently traveled to or from an area where the virus is common.  ? You provide care for or live with a person who was diagnosed with COVID-19.  ? You were exposed to a person who was diagnosed with COVID-19.  · A physical exam.  · Lab tests, which may include:  ? Taking a sample of fluid from the back of your nose and throat (nasopharyngeal fluid), your nose, or your throat using a swab.  ? A sample of mucus from your lungs (sputum).  ? Blood tests.  · Imaging tests, " which may include, X-rays, CT scan, or ultrasound.  How is this treated?  At present, there is no medicine to treat COVID-19. Medicines that treat other diseases are being used on a trial basis to see if they are effective against COVID-19.  Your health care provider will talk with you about ways to treat your symptoms. For most people, the infection is mild and can be managed at home with rest, fluids, and over-the-counter medicines.  Treatment for a serious infection usually takes places in a hospital intensive care unit (ICU). It may include one or more of the following treatments. These treatments are given until your symptoms improve.  · Receiving fluids and medicines through an IV.  · Supplemental oxygen. Extra oxygen is given through a tube in the nose, a face mask, or a dunbar.  · Positioning you to lie on your stomach (prone position). This makes it easier for oxygen to get into the lungs.  · Continuous positive airway pressure (CPAP) or bi-level positive airway pressure (BPAP) machine. This treatment uses mild air pressure to keep the airways open. A tube that is connected to a motor delivers oxygen to the body.  · Ventilator. This treatment moves air into and out of the lungs by using a tube that is placed in your windpipe.  · Tracheostomy. This is a procedure to create a hole in the neck so that a breathing tube can be inserted.  · Extracorporeal membrane oxygenation (ECMO). This procedure gives the lungs a chance to recover by taking over the functions of the heart and lungs. It supplies oxygen to the body and removes carbon dioxide.  Follow these instructions at home:  Lifestyle  · If you are sick, stay home except to get medical care. Your health care provider will tell you how long to stay home. Call your health care provider before you go for medical care.  · Rest at home as told by your health care provider.  · Do not use any products that contain nicotine or tobacco, such as cigarettes,  e-cigarettes, and chewing tobacco. If you need help quitting, ask your health care provider.  · Return to your normal activities as told by your health care provider. Ask your health care provider what activities are safe for you.  General instructions  · Take over-the-counter and prescription medicines only as told by your health care provider.  · Drink enough fluid to keep your urine pale yellow.  · Keep all follow-up visits as told by your health care provider. This is important.  How is this prevented?    There is no vaccine to help prevent COVID-19 infection. However, there are steps you can take to protect yourself and others from this virus.  To protect yourself:   · Do not travel to areas where COVID-19 is a risk. The areas where COVID-19 is reported change often. To identify high-risk areas and travel restrictions, check the CDC travel website: wwwnc.cdc.gov/travel/notices  · If you live in, or must travel to, an area where COVID-19 is a risk, take precautions to avoid infection.  ? Stay away from people who are sick.  ? Wash your hands often with soap and water for 20 seconds. If soap and water are not available, use an alcohol-based hand .  ? Avoid touching your mouth, face, eyes, or nose.  ? Avoid going out in public, follow guidance from your state and local health authorities.  ? If you must go out in public, wear a cloth face covering or face mask. Make sure your mask covers your nose and mouth.  ? Avoid crowded indoor spaces. Stay at least 6 feet (2 meters) away from others.  ? Disinfect objects and surfaces that are frequently touched every day. This may include:  § Counters and tables.  § Doorknobs and light switches.  § Sinks and faucets.  § Electronics, such as phones, remote controls, keyboards, computers, and tablets.  To protect others:  If you have symptoms of COVID-19, take steps to prevent the virus from spreading to others.  · If you think you have a COVID-19 infection, contact  your health care provider right away. Tell your health care team that you think you may have a COVID-19 infection.  · Stay home. Leave your house only to seek medical care. Do not use public transport.  · Do not travel while you are sick.  · Wash your hands often with soap and water for 20 seconds. If soap and water are not available, use alcohol-based hand .  · Stay away from other members of your household. Let healthy household members care for children and pets, if possible. If you have to care for children or pets, wash your hands often and wear a mask. If possible, stay in your own room, separate from others. Use a different bathroom.  · Make sure that all people in your household wash their hands well and often.  · Cough or sneeze into a tissue or your sleeve or elbow. Do not cough or sneeze into your hand or into the air.  · Wear a cloth face covering or face mask. Make sure your mask covers your nose and mouth.  Where to find more information  · Centers for Disease Control and Prevention: www.cdc.gov/coronavirus/2019-ncov/index.html  · World Health Organization: www.who.int/health-topics/coronavirus  Contact a health care provider if:  · You live in or have traveled to an area where COVID-19 is a risk and you have symptoms of the infection.  · You have had contact with someone who has COVID-19 and you have symptoms of the infection.  Get help right away if:  · You have trouble breathing.  · You have pain or pressure in your chest.  · You have confusion.  · You have bluish lips and fingernails.  · You have difficulty waking from sleep.  · You have symptoms that get worse.  These symptoms may represent a serious problem that is an emergency. Do not wait to see if the symptoms will go away. Get medical help right away. Call your local emergency services (911 in the [place] Do not drive yourself to the hospital. Let the emergency medical personnel know if you think you have  COVID-19.  Summary  · COVID-19 is a respiratory infection that is caused by a virus. It is also known as coronavirus disease or novel coronavirus. It can cause serious infections, such as pneumonia, acute respiratory distress syndrome, acute respiratory failure, or sepsis.  · The virus that causes COVID-19 is contagious. This means that it can spread from person to person through droplets from breathing, speaking, singing, coughing, or sneezing.  · You are more likely to develop a serious illness if you are 50 years of age or older, have a weak immune system, live in a nursing home, or have chronic disease.  · There is no medicine to treat COVID-19. Your health care provider will talk with you about ways to treat your symptoms.  · Take steps to protect yourself and others from infection. Wash your hands often and disinfect objects and surfaces that are frequently touched every day. Stay away from people who are sick and wear a mask if you are sick.  This information is not intended to replace advice given to you by your health care provider. Make sure you discuss any questions you have with your health care provider.  Document Revised: 10/16/2020 Document Reviewed: 01/23/2020  [employer] Patient Education © 2020 [employer]

## 2020-12-14 ENCOUNTER — OFFICE VISIT (OUTPATIENT)
Dept: FAMILY MEDICINE CLINIC | Facility: CLINIC | Age: 50
End: 2020-12-14

## 2020-12-14 VITALS
HEIGHT: 72 IN | BODY MASS INDEX: 27.95 KG/M2 | TEMPERATURE: 97.7 F | HEART RATE: 78 BPM | SYSTOLIC BLOOD PRESSURE: 116 MMHG | WEIGHT: 206.38 LBS | RESPIRATION RATE: 20 BRPM | OXYGEN SATURATION: 98 % | DIASTOLIC BLOOD PRESSURE: 74 MMHG

## 2020-12-14 DIAGNOSIS — U07.1 COVID-19 VIRUS INFECTION: Primary | ICD-10-CM

## 2020-12-14 PROCEDURE — 99213 OFFICE O/P EST LOW 20 MIN: CPT | Performed by: NURSE PRACTITIONER

## 2020-12-14 NOTE — PROGRESS NOTES
"Subjective   Manohar Marie is a 50 y.o. male.     [facility]    PCP: Dr. Solis    CC: \"dry cough, covid\"    Tested + for Covid on 12-1, had negative Covid test today.  Still has dyspnea and dry cough.  Lost 15 pounds over the last 2 weeks.  Did telehealth visit with me on 12-11 and started on Zithromax, Prednisone.  Appetite and strength slowly returning.  Seen in ER last week and CXR, labs, cardiac work up were all reportedly WNL.    Illness  Chronicity: dx with Covid on 12-1. The current episode started 1 to 4 weeks ago. The problem occurs constantly. The problem has been gradually improving (minimal improvement until the last few days, now seeing slow improvement). Associated symptoms include coughing (dry), fatigue and weakness. Pertinent negatives include no abdominal pain, anorexia, arthralgias, change in bowel habit, chest pain (tightness), chills, congestion, diaphoresis, fever, headaches, joint swelling, myalgias, nausea, neck pain, numbness, rash, sore throat, swollen glands, urinary symptoms, vertigo, visual change or vomiting. Nothing aggravates the symptoms. He has tried rest (currently on Zithromax, Prednisone, Albuterol) for the symptoms. The treatment provided mild relief.        The following portions of the patient's history were reviewed and updated as appropriate: allergies, current medications, past medical history, past social history, past surgical history and problem list.    Review of Systems   Constitutional: Positive for appetite change ( slowly improving the last few days) and fatigue. Negative for chills, diaphoresis and fever.   HENT: Negative for congestion, ear discharge, ear pain, postnasal drip, rhinorrhea, sinus pressure, sneezing, sore throat, swollen glands and trouble swallowing.         +loss smell/taste--slowly returning   Eyes: Negative.    Respiratory: Positive for cough (dry), chest tightness and shortness of breath. Negative for wheezing.  " "  Cardiovascular: Negative for chest pain (tightness), palpitations and leg swelling.   Gastrointestinal: Negative for abdominal pain, anorexia, change in bowel habit, diarrhea, nausea and vomiting.   Genitourinary: Negative for difficulty urinating.   Musculoskeletal: Negative for arthralgias, joint swelling, myalgias and neck pain.   Skin: Negative for rash.   Neurological: Positive for weakness. Negative for dizziness, vertigo, numbness and headache.     /74 (BP Location: Right arm, Patient Position: Sitting, Cuff Size: Adult)   Pulse 78   Temp 97.7 °F (36.5 °C) (Temporal)   Resp 20   Ht 182.9 cm (72\")   Wt 93.6 kg (206 lb 6 oz)   SpO2 98%   BMI 27.99 kg/m²     Objective   Physical Exam  Vitals signs and nursing note reviewed.   Constitutional:       General: He is not in acute distress.     Appearance: He is ill-appearing.   HENT:      Head: Normocephalic and atraumatic.      Right Ear: Tympanic membrane and ear canal normal.      Left Ear: Tympanic membrane and ear canal normal.      Nose: Nose normal. No congestion.      Mouth/Throat:      Mouth: Mucous membranes are moist.      Pharynx: No oropharyngeal exudate or posterior oropharyngeal erythema.   Eyes:      General:         Right eye: No discharge.         Left eye: No discharge.      Conjunctiva/sclera: Conjunctivae normal.   Neck:      Musculoskeletal: Neck supple. No muscular tenderness.   Cardiovascular:      Rate and Rhythm: Normal rate and regular rhythm.   Pulmonary:      Effort: Pulmonary effort is normal. No respiratory distress.      Breath sounds: No wheezing, rhonchi or rales.      Comments: Decreased aeration with tight, dry cough noted, especially with inhalation    Lymphadenopathy:      Cervical: No cervical adenopathy.   Skin:     General: Skin is warm and dry.      Coloration: Skin is pale.   Neurological:      General: No focal deficit present.      Mental Status: He is alert and oriented to person, place, and time.       No " results found for this or any previous visit (from the past 24 hour(s)).        Assessment/Plan   Diagnoses and all orders for this visit:    1. COVID-19 virus infection (Primary)    Continue to push fluids  Encouraged Glucerna shakes until appetite returns to normal  Slowly increase activities/exercise to build up strength  Finish Zithromax, Prednisone--continue to monitor blood sugars while on Prednisone--highest reading has been 200  Continue with Albuterol inhaler as needed    Off work the remainder of this week--note given    See PCP or RTC if symptoms persist/worsen  See PCP for routine f/u visit and management of chronic medical conditions      This document has been electronically signed by SHUKRI Nevarez on December 14, 2020 17:44 CST,.

## 2024-09-29 NOTE — TELEPHONE ENCOUNTER
Spoke to pt and made aware. He voiced understanding and agreed to a telephone visit. Aimee is requesting records from [facility] where he has test done.    29-Sep-2024 19:58